# Patient Record
Sex: FEMALE | Race: WHITE | NOT HISPANIC OR LATINO | Employment: OTHER | URBAN - METROPOLITAN AREA
[De-identification: names, ages, dates, MRNs, and addresses within clinical notes are randomized per-mention and may not be internally consistent; named-entity substitution may affect disease eponyms.]

---

## 2020-12-11 ENCOUNTER — HOSPITAL ENCOUNTER (OUTPATIENT)
Dept: MRI IMAGING | Facility: CLINIC | Age: 65
Discharge: HOME | End: 2020-12-11
Admitting: INTERNAL MEDICINE
Payer: COMMERCIAL

## 2020-12-11 DIAGNOSIS — R20.2 PARESTHESIA OF LEFT FOOT: ICD-10-CM

## 2020-12-11 DIAGNOSIS — M79.89 PAIN AND SWELLING OF TOE OF LEFT FOOT: ICD-10-CM

## 2020-12-11 DIAGNOSIS — M79.675 PAIN AND SWELLING OF TOE OF LEFT FOOT: ICD-10-CM

## 2020-12-11 DIAGNOSIS — S90.852D FOREIGN BODY IN LEFT FOOT, SUBSEQUENT ENCOUNTER: ICD-10-CM

## 2020-12-11 PROCEDURE — 73718 MRI LOWER EXTREMITY W/O DYE: CPT | Mod: LT

## 2020-12-11 PROCEDURE — 73718 MRI LOWER EXTREMITY W/O DYE: CPT | Mod: 26 | Performed by: RADIOLOGY

## 2021-01-15 ENCOUNTER — HEALTH MAINTENANCE LETTER (OUTPATIENT)
Age: 66
End: 2021-01-15

## 2021-03-08 NOTE — PROGRESS NOTES
SUBJECTIVE:   CC: Duke Jimenez is an 66 year old woman who presents for preventive health visit.     Patient has been advised of split billing requirements and indicates understanding: Yes  Healthy Habits:     Getting at least 3 servings of Calcium per day:  Yes    Bi-annual eye exam:  Yes    Dental care twice a year:  Yes    Sleep apnea or symptoms of sleep apnea:  None    Diet:  Regular (no restrictions)    Frequency of exercise:  4-5 days/week    Duration of exercise:  45-60 minutes    Taking medications regularly:  Not Applicable    Medication side effects:  Not applicable    PHQ-2 Total Score: 0    Additional concerns today:  Yes      Today's PHQ-2 Score:   PHQ-2 ( 1999 [employer]) 3/9/2021   Q1: Little interest or pleasure in doing things 0   Q2: Feeling down, depressed or hopeless 0   PHQ-2 Score 0   Q1: Little interest or pleasure in doing things Not at all   Q2: Feeling down, depressed or hopeless Not at all   PHQ-2 Score 0       Abuse: Current or Past (Physical, Sexual or Emotional) - No  Do you feel safe in your environment? Yes    Have you ever done Advance Care Planning? (For example, a Health Directive, POLST, or a discussion with a medical provider or your loved ones about your wishes): No, advance care planning information given to patient to review.  Patient declined advance care planning discussion at this time.    Social History     Tobacco Use     Smoking status: Never Smoker     Smokeless tobacco: Never Used   Substance Use Topics     Alcohol use: Never         Alcohol Use 3/9/2021   Prescreen: >3 drinks/day or >7 drinks/week? No       Any new diagnosis of family breast, ovarian, or bowel cancer? No     Reviewed orders with patient.  Reviewed health maintenance and updated orders accordingly - Yes  BP Readings from Last 3 Encounters:   03/10/21 132/70    Wt Readings from Last 3 Encounters:   03/10/21 50.3 kg (111 lb)                  Patient Active Problem List   Diagnosis     Constipation      Post-menopausal     S/P exploratory laparotomy     Advanced care planning/counseling discussion     Past Surgical History:   Procedure Laterality Date     ABDOMEN SURGERY  06/2018    Exploratory Lap - internal hernia reduction, appendectomy, cecopexy.  Dr. June Delatorre     APPENDECTOMY  06/2018     AS ESOPHAGOSCOPY, DIAGNOSTIC  12/14/2012     AS HYSTEROSCOPY, SURGICAL; W/ ENDOMETRIAL ABLATION, ANY METHOD       BREAST BIOPSY, CORE RT/LT  2003    Benign     COLONOSCOPY      12/14/2012. 12/17/2018, 09/17/2020     IR OR ANGIOGRAM  08/06/2018    [facility] Dr. Luevano     LUMPECTOMY BREAST  2004     TONSILLECTOMY       VASCULAR SURGERY  06/2018       Social History     Tobacco Use     Smoking status: Never Smoker     Smokeless tobacco: Never Used   Substance Use Topics     Alcohol use: Never     Family History   Problem Relation Age of Onset     Breast Cancer Sister      Breast Cancer Sister      No Known Problems Mother      Heart Disease Father      Alzheimer Disease Father      Cancer Paternal Grandmother          No current outpatient medications on file.     No Known Allergies    Breast CA Risk Screening:  Breast CA Risk Assessment (FHS-7) 3/9/2021   Do you have a family history of breast, colon, or ovarian cancer? Yes   Did any of your first-degree relatives have breast or ovarian cancer? Yes   Did any of your relatives have bilateral breast cancer? No   Did any man in your family have breast cancer? No   Did any woman in your family have breast and ovarian cancer? Yes   Did any woman in your family have breast cancer before age 50 y? No   Do you have 2 or more relatives with breast and/or ovarian cancer? No   Do you have 2 or more relatives with breast and/or bowel cancer? No         Mammogram Screening: Recommended mammography every 1-2 years with patient discussion and risk factor consideration  Pertinent mammograms are reviewed under the imaging tab.    History of abnormal Pap smear: NO - age 30-65  "PAP every 5 years with negative HPV co-testing recommended     Reviewed and updated as needed this visit by clinical staff  Tobacco  Allergies  Meds   Med Hx  Surg Hx  Fam Hx  Soc Hx        Reviewed and updated as needed this visit by Provider                    Review of Systems   Constitutional: Negative for chills and fever.   HENT: Negative for congestion, ear pain, hearing loss and sore throat.    Eyes: Negative for pain and visual disturbance.   Respiratory: Negative for cough and shortness of breath.    Cardiovascular: Negative for chest pain, palpitations and peripheral edema.   Gastrointestinal: Negative for abdominal pain, constipation, diarrhea, heartburn, hematochezia and nausea.   Breasts:  Negative for tenderness, breast mass and discharge.   Genitourinary: Negative for dysuria, frequency, genital sores, hematuria, pelvic pain, urgency, vaginal bleeding and vaginal discharge.   Musculoskeletal: Negative for arthralgias, joint swelling and myalgias.   Skin: Negative for rash.   Neurological: Negative for dizziness, weakness, headaches and paresthesias.   Psychiatric/Behavioral: Negative for mood changes. The patient is not nervous/anxious.         OBJECTIVE:   /70   Pulse 77   Temp 97.2  F (36.2  C) (Temporal)   Ht 1.577 m (5' 2.09\")   Wt 50.3 kg (111 lb)   SpO2 99%   BMI 20.25 kg/m    Physical Exam  GENERAL: healthy, alert and no distress  EYES: Eyes grossly normal to inspection, PERRL and conjunctivae and sclerae normal  HENT: ear canals and TM's normal, nose and mouth without ulcers or lesions  NECK: no adenopathy, no asymmetry, masses, or scars and thyroid normal to palpation  RESP: lungs clear to auscultation - no rales, rhonchi or wheezes  BREAST: normal without masses, tenderness or nipple discharge and no palpable axillary masses or adenopathy  CV: regular rate and rhythm, normal S1 S2, no S3 or S4, no murmur, click or rub, no peripheral edema and peripheral pulses " "strong  ABDOMEN: soft, nontender, no hepatosplenomegaly, no masses and bowel sounds normal  MS: no gross musculoskeletal defects noted, no edema  SKIN: no suspicious lesions or rashes  NEURO: Normal strength and tone, mentation intact and speech normal  PSYCH: mentation appears normal, affect normal/bright    Diagnostic Test Results:  Labs reviewed in Epic    ASSESSMENT/PLAN:       ICD-10-CM    1. Routine general medical examination at a health care facility  Z00.00 REVIEW OF HEALTH MAINTENANCE PROTOCOL ORDERS   2. Need for prophylactic vaccination with tetanus-diphtheria (Td)  Z23 TD PRESERV FREE, IM (7+ YRS)   3. Advanced care planning/counseling discussion  Z71.89    - reviewed CareEverywhere and updated chart.   - She is up to date on screening labs, recommend repeat in 1 year.   - We discussed COVID vaccination.   - She is up to date on Colon cancer screening, will need to dig further for recommendations on frequency given her history  - Mammogram scheduled for later this month.   - ACD discussed, given form.     Patient has been advised of split billing requirements and indicates understanding: Yes  COUNSELING:  Reviewed preventive health counseling, as reflected in patient instructions       Regular exercise       Healthy diet/nutrition       Immunizations         Colon cancer screening       Advance Care Planning    Estimated body mass index is 20.25 kg/m  as calculated from the following:    Height as of this encounter: 1.577 m (5' 2.09\").    Weight as of this encounter: 50.3 kg (111 lb).        She reports that she has never smoked. She has never used smokeless tobacco.      Counseling Resources:  ATP IV Guidelines  Pooled Cohorts Equation Calculator  Breast Cancer Risk Calculator  BRCA-Related Cancer Risk Assessment: FHS-7 Tool  FRAX Risk Assessment  ICSI Preventive Guidelines  Dietary Guidelines for Americans, 2010  USDA's MyPlate  ASA Prophylaxis  Lung CA Screening    Oscar Garcia PA-C   HEALTH " [facility]

## 2021-03-09 ASSESSMENT — ENCOUNTER SYMPTOMS
SHORTNESS OF BREATH: 0
FREQUENCY: 0
MYALGIAS: 0
HEADACHES: 0
SORE THROAT: 0
HEARTBURN: 0
CHILLS: 0
COUGH: 0
DYSURIA: 0
JOINT SWELLING: 0
ARTHRALGIAS: 0
EYE PAIN: 0
HEMATOCHEZIA: 0
BREAST MASS: 0
HEMATURIA: 0
CONSTIPATION: 0
WEAKNESS: 0
NERVOUS/ANXIOUS: 0
PALPITATIONS: 0
FEVER: 0
ABDOMINAL PAIN: 0
DIZZINESS: 0
PARESTHESIAS: 0
DIARRHEA: 0
NAUSEA: 0

## 2021-03-10 ENCOUNTER — OFFICE VISIT (OUTPATIENT)
Dept: FAMILY MEDICINE | Facility: OTHER | Age: 66
End: 2021-03-10
Payer: COMMERCIAL

## 2021-03-10 VITALS
WEIGHT: 111 LBS | DIASTOLIC BLOOD PRESSURE: 70 MMHG | OXYGEN SATURATION: 99 % | TEMPERATURE: 97.2 F | BODY MASS INDEX: 20.43 KG/M2 | SYSTOLIC BLOOD PRESSURE: 132 MMHG | HEART RATE: 77 BPM | HEIGHT: 62 IN

## 2021-03-10 DIAGNOSIS — Z00.00 ROUTINE GENERAL MEDICAL EXAMINATION AT A HEALTH CARE FACILITY: Primary | ICD-10-CM

## 2021-03-10 DIAGNOSIS — Z23 NEED FOR PROPHYLACTIC VACCINATION WITH TETANUS-DIPHTHERIA (TD): ICD-10-CM

## 2021-03-10 DIAGNOSIS — Z71.89 ADVANCED CARE PLANNING/COUNSELING DISCUSSION: ICD-10-CM

## 2021-03-10 PROBLEM — Z98.890 S/P EXPLORATORY LAPAROTOMY: Status: ACTIVE | Noted: 2018-06-24

## 2021-03-10 PROCEDURE — 99386 PREV VISIT NEW AGE 40-64: CPT | Mod: 25 | Performed by: PHYSICIAN ASSISTANT

## 2021-03-10 PROCEDURE — 90471 IMMUNIZATION ADMIN: CPT | Performed by: PHYSICIAN ASSISTANT

## 2021-03-10 PROCEDURE — 90714 TD VACC NO PRESV 7 YRS+ IM: CPT | Performed by: PHYSICIAN ASSISTANT

## 2021-03-10 ASSESSMENT — ENCOUNTER SYMPTOMS
HEMATURIA: 0
NAUSEA: 0
ABDOMINAL PAIN: 0
COUGH: 0
MYALGIAS: 0
HEMATOCHEZIA: 0
JOINT SWELLING: 0
ARTHRALGIAS: 0
HEARTBURN: 0
SHORTNESS OF BREATH: 0
WEAKNESS: 0
DIZZINESS: 0
DYSURIA: 0
PARESTHESIAS: 0
FEVER: 0
SORE THROAT: 0
EYE PAIN: 0
HEADACHES: 0
DIARRHEA: 0
CHILLS: 0
FREQUENCY: 0
NERVOUS/ANXIOUS: 0
CONSTIPATION: 0
PALPITATIONS: 0
BREAST MASS: 0

## 2021-03-10 ASSESSMENT — MIFFLIN-ST. JEOR: SCORE: 1008.12

## 2021-03-10 NOTE — NURSING NOTE
Prior to immunization administration, verified patients identity using patient s name and date of birth. Please see Immunization Activity for additional information.     Screening Questionnaire for Adult Immunization    Are you sick today?   No   Do you have allergies to medications, food, a vaccine component or latex?   No   Have you ever had a serious reaction after receiving a vaccination?   No   Do you have a long-term health problem with heart, lung, kidney, or metabolic disease (e.g., diabetes), asthma, a blood disorder, no spleen, complement component deficiency, a cochlear implant, or a spinal fluid leak?  Are you on long-term aspirin therapy?   No   Do you have cancer, leukemia, HIV/AIDS, or any other immune system problem?   No   Do you have a parent, brother, or sister with an immune system problem?   No   In the past 3 months, have you taken medications that affect  your immune system, such as prednisone, other steroids, or anticancer drugs; drugs for the treatment of rheumatoid arthritis, Crohn s disease, or psoriasis; or have you had radiation treatments?   No   Have you had a seizure, or a brain or other nervous system problem?   No   During the past year, have you received a transfusion of blood or blood    products, or been given immune (gamma) globulin or antiviral drug?   No   For women: Are you pregnant or is there a chance you could become       pregnant during the next month?   No   Have you received any vaccinations in the past 4 weeks?   No     Immunization questionnaire answers were all negative.        Per orders of Oscar Garcia PA-C, injection of Td given by Jenelle Soto MA. Patient instructed to remain in clinic for 15 minutes afterwards, and to report any adverse reaction to me immediately.       Screening performed by Jenelle Soto MA on 3/10/2021 at 8:40 AM.

## 2021-03-16 ENCOUNTER — OFFICE VISIT (OUTPATIENT)
Dept: PODIATRY | Facility: CLINIC | Age: 66
End: 2021-03-16
Payer: COMMERCIAL

## 2021-03-16 VITALS
HEIGHT: 62 IN | TEMPERATURE: 97.2 F | BODY MASS INDEX: 20.43 KG/M2 | SYSTOLIC BLOOD PRESSURE: 128 MMHG | WEIGHT: 111 LBS | DIASTOLIC BLOOD PRESSURE: 82 MMHG

## 2021-03-16 DIAGNOSIS — I73.00 RAYNAUD'S PHENOMENON WITHOUT GANGRENE: Primary | ICD-10-CM

## 2021-03-16 PROCEDURE — 99203 OFFICE O/P NEW LOW 30 MIN: CPT | Performed by: PODIATRIST

## 2021-03-16 ASSESSMENT — PAIN SCALES - GENERAL: PAINLEVEL: NO PAIN (0)

## 2021-03-16 ASSESSMENT — MIFFLIN-ST. JEOR: SCORE: 1008.17

## 2021-03-16 NOTE — PROGRESS NOTES
HPI:  Duke Jimenez is a 66 year old female who is seen in consultation at the request of self.    Pt presents for eval of:   (Onset, Location, L/R, Character, Treatments, Injury if yes)    MRI Left foot 12/11/2020       Onset May 2019, while [place] and walking in the ocean, came out of water and noticed an open wound across Left great, 2nd and 3rd toes. Did not seek medical care at that time. Still having constant redness, swelling, numbness, intermittent, throbbing at rest only. Denies pain.    Retired.    ROS:  10 point ROS neg other than the symptoms noted above in the HPI.    Patient Active Problem List   Diagnosis     Constipation     Post-menopausal     S/P exploratory laparotomy     Advanced care planning/counseling discussion       PAST MEDICAL HISTORY:   Past Medical History:   Diagnosis Date     AVM (arteriovenous malformation)         PAST SURGICAL HISTORY:   Past Surgical History:   Procedure Laterality Date     ABDOMEN SURGERY  06/2018    Exploratory Lap - internal hernia reduction, appendectomy, cecopexy.  Dr. June Delatorre     APPENDECTOMY  06/2018     AS ESOPHAGOSCOPY, DIAGNOSTIC  12/14/2012     AS HYSTEROSCOPY, SURGICAL; W/ ENDOMETRIAL ABLATION, ANY METHOD       BREAST BIOPSY, CORE RT/LT  2003    Benign     COLONOSCOPY      12/14/2012. 12/17/2018, 09/17/2020     IR OR ANGIOGRAM  08/06/2018    [facility] Dr. Luevano     LUMPECTOMY BREAST  2004     TONSILLECTOMY       VASCULAR SURGERY  06/2018        MEDICATIONS: No current outpatient medications on file.     ALLERGIES:  No Known Allergies     SOCIAL HISTORY:   Social History     Socioeconomic History     Marital status:      Spouse name: Not on file     Number of children: Not on file     Years of education: Not on file     Highest education level: Not on file   Occupational History     Not on file   Social Needs     Financial resource strain: Not on file     Food insecurity     Worry: Not on file     Inability: Not on file      "Transportation needs     Medical: Not on file     Non-medical: Not on file   Tobacco Use     Smoking status: Never Smoker     Smokeless tobacco: Never Used   Substance and Sexual Activity     Alcohol use: Never     Drug use: Never     Sexual activity: Not Currently   Lifestyle     Physical activity     Days per week: Not on file     Minutes per session: Not on file     Stress: Not on file   Relationships     Social connections     Talks on phone: Not on file     Gets together: Not on file     Attends Congregational service: Not on file     Active member of club or organization: Not on file     Attends meetings of clubs or organizations: Not on file     Relationship status: Not on file     Intimate partner violence     Fear of current or ex partner: Not on file     Emotionally abused: Not on file     Physically abused: Not on file     Forced sexual activity: Not on file   Other Topics Concern     Parent/sibling w/ CABG, MI or angioplasty before 65F 55M? No   Social History Narrative     Not on file        FAMILY HISTORY:   Family History   Problem Relation Age of Onset     Breast Cancer Sister      Breast Cancer Sister      No Known Problems Mother      Heart Disease Father      Alzheimer Disease Father      Cancer Paternal Grandmother         EXAM:Vitals: /82 (BP Location: Left arm, Patient Position: Sitting, Cuff Size: Adult Regular)   Temp 97.2  F (36.2  C) (Temporal)   Ht 1.577 m (5' 2.09\")   Wt 50.3 kg (111 lb)   BMI 20.24 kg/m    BMI= Body mass index is 20.24 kg/m .    General appearance: Patient is alert and fully cooperative with history & exam.  No sign of distress is noted during the visit.     Psychiatric: Affect is pleasant & appropriate.  Patient appears motivated to improve health.     Respiratory: Breathing is regular & unlabored while sitting.     HEENT: Hearing is intact to spoken word.  Speech is clear.  No gross evidence of visual impairment that would impact ambulation.     Vascular: DP & PT " pulses are intact & regular bilaterally.  Temperature is warm to cool proximal to distal.  Dependent rubor is noted on all of the digits distally throughout the entire digit on the left foot and less so about the right foot only at the distal aspect of the right foot.  Most of this discoloration and purplish dependent rubor is noted about the 5 digits on the left foot and it is mildly painful on the left second toe.    Neurologic: Lower extremity sensation is intact to light touch.  No evidence of weakness or contracture in the lower extremities.  No evidence of neuropathy.    Dermatologic: Skin is intact to both lower extremities with adequate texture, turgor and tone about the integument.  Mildly diminished hair growth noted about the digits.  No paronychia or evidence of soft tissue infection is noted.     Musculoskeletal: Patient is ambulatory without assistive device or brace.  Negative anterior drawer at the MPJ.  No contracture of the lesser metatarsal phalangeal joints or PIPJ or DIPJ.  Manual muscle strength +5/5 to all 4 quadrants.  Mild hallux valgus noted with fairly longer prominent second metatarsal phalangeal joint however most of her symptoms are associated about the distal digit without hyperkeratosis.    MRI 12/20 demonstrates minimal degenerative changes.  No acute cortical reaction.  Plantar plate intact.  Small reactive spot of edema in the head of the second metatarsal consistent with mild very mild degeneration.       ASSESSMENT:       ICD-10-CM    1. Raynaud's phenomenon without gangrene  I73.00         PLAN:  Reviewed patient's chart in [facility].      3/16/2021   Discussed etiology and treatment options regarding Raynaud's phenomenon.  Recommended avoiding temperature gradients.  Written instructions dispensed.  Discussed etiology and treatment options and typical progression over time.      Also discussed presentation of hallux valgus and overuse overloading of the second MPJ likely  contributing to some of her symptoms and discussed appropriate shoe gear arch support and how and when to address this.  All questions were answered.  Written instructions dispensed.  Follow-up as needed.      Jose Barnes DPM

## 2021-03-16 NOTE — PATIENT INSTRUCTIONS
Raynaud s Phenomenon  Raynaud s phenomenon is a condition which results in a bluish-white discoloration of fingers and toes, often as a result of exposure to cold. Stress, smoking, and certain medications may trigger or worsen symptoms. The color change, which occurs from spasms in small blood vessels, becomes red and then returns to normal when blood flow resumes.    The condition most often affects women, with symptoms varying depending on the severity of the condition. Because there are no specific blood tests to diagnose this condition, the diagnosis is based on symptoms.    However, your doctor may order blood tests to determine whether the Raynaud s phenomenon is associated with certain autoimmune diseases or other medical conditions.  Treatment for Raynaud s phenomenon is aimed at prevention and protection of the digits.  Self-care and prevention steps, such as dressing in layers or wearing gloves or heavy socks, usually are effective in dealing with mild symptoms of Raynaud's. If these aren't adequate, however, medications are available to treat more-severe forms of the condition. The goals of treatment are to:   Reduce the number and severity of attacks   Prevent tissue damage   Treat any underlying disease or condition     Don't smoke. Smoking causes skin temperature to drop by constricting blood vessels, which may lead to an attack. Inhaling secondhand smoke also may aggravate Raynaud's.   Exercise. Your doctor may encourage you to exercise regularly, particularly if you have primary Raynaud's. Exercise can increase circulation, among other health benefits.   Control stress. Because stress may trigger an attack, learning to recognize and avoid stressful situations may help control the number of attacks.   Avoid caffeine. Caffeine causes your blood vessels to narrow and may increase the signs and symptoms of Raynaud's.   Take care of your hands and feet. If you have Raynaud's, guard your hands and feet  from injury. Don't walk barefoot. Take care of your nails to avoid injuring sensitive toes and fingertips. In addition, avoid wearing anything that compresses blood vessels in your hands or feet, such as tight wristbands, rings or footwear.   Avoid workplace triggers. Avoiding tools that vibrate the hand may reduce the frequency of attacks.   During an attack: What should you do?  What should you do if you're experiencing an attack of Raynaud's? The first and most important action is to warm your hands or feet or any other affected areas of skin. The following steps can help you gently warm your fingers and toes:   Move to a warmer area.   Place your hands under your armpits.   Wiggle your fingers and toes.   Make wide circles (windmills) with your arms.   Run warm -- but not hot -- water over your fingers and toes.   Massage your hands and feet.   If a stressful situation triggers an attack, you can help stop the attack by getting out of the stressful situation and relaxing. If you're trained in biofeedback, you can use this technique along with warming your hands or feet in water to help lessen the attack.     Lifestyle changes and supplements that encourage better circulation may be effective alternatives for managing Raynaud's. If you're interested, talk to your doctor about:   Fish oil. Taking fish oil supplements could help improve your tolerance to cold and delay the narrowing of your blood vessels that triggers Raynaud's syndrome attacks.   Ginkgo. Ginkgo supplements could help decrease the number of Raynaud's attacks you have.   Biofeedback. Using your mind to control body temperature (biofeedback) may help decrease the severity and frequency of attacks. Biofeedback includes guided imagery to increase the temperature of hands and feet, deep breathing, and other relaxation exercises. Your doctor may be able to suggest a therapist who can help you learn biofeedback techniques. Books and tapes also are  available on the subject.   As with any supplement, be sure to talk to your doctor before adding it to your treatment regimen. Your doctor can warn you if there are any potential drug interactions or side effects of alternative treatments.   Coping with the stress and nuisance of Raynaud's takes patience and effort. Work with your doctor to manage your condition and maintain a positive attitude. The majority of people with Raynaud's respond to treatment.   Medications  Depending on the cause of your symptoms, medications may help treat Raynaud's. To widen (dilate) blood vessels and promote circulation, your doctor may prescribe:   Calcium channel blockers. These drugs relax and open up small blood vessels in your hands and feet. They decrease the frequency and severity of attacks in most people with Raynaud's. These drugs can also help heal skin ulcers on your fingers or toes. Examples include nifedipine (Adalat CC, Afeditab CR, Procardia), amlodipine (Norvasc) and felodipine (Plendil).   Alpha blockers. Some people find relief with drugs called alpha blockers, which counteract the actions of norepinephrine, a hormone that constricts blood vessels. Examples include prazosin (Minipress) and doxazosin (Cardura).   Vasodilators. Some doctors prescribe a vasodilator -- a drug that relaxes blood vessels -- such as nitroglycerin cream to your fingers to help heal skin ulcers. Your doctor may also prescribe vasodilator drugs that are commonly used to treat other conditions, but may effectively relieve the symptoms of Raynaud's. These drugs include the high blood pressure drug losartan (Cozaar), the erectile dysfunction medication sildenafil (Viagra, Revatio), the antidepressant medication fluoxetine (Prozac, Sarafem), and a class of medication called prostaglandins.   You and your doctor may find that one drug works better for you than another. Some drugs used to treat Raynaud's have side effects that may require you to  stop taking the medication. A drug may also lose effectiveness over time. Work with your doctor to find what works best for you.   Some medications actually can aggravate Raynaud's by leading to increased blood vessel spasm. Your doctor may recommend that you avoid taking:   Certain over-the-counter (OTC) cold drugs. Examples include drugs that contain pseudoephedrine (Chlor-Trimeton, Sudafed).   Beta blockers. This class of drugs, used to treat high blood pressure and heart disease, includes metoprolol (Lopressor, Toprol XL), nadolol (Corgard) and propranolol (Inderal, Innopran XL).   Birth control pills. If you use birth control pills, you may wish to switch to another method of contraception because these drugs affect your circulation and may make you more prone to attacks. Talk to your doctor before stopping the pill.   If you have questions about how best to manage Raynaud's, contact your doctor. Your primary care doctor may refer you to a physician who specializes in treating Raynaud's.   Surgeries and medical procedures  Sometimes in cases of severe Raynaud's, approaches other than medications may be a treatment option:   Nerve surgery. Nerves called sympathetic nerves in your hands and feet control the opening and narrowing of blood vessels in your skin. Sometimes it's necessary in cases of severe Raynaud's to cut these nerves to interrupt their exaggerated response. Through small incisions in the affected hands or feet, a doctor strips away these tiny nerves around the blood vessels. The surgery, called sympathectomy, may reduce the frequency and duration of attacks, but it's not always successful.   Chemical injection. Doctors can inject chemicals to block sympathetic nerves in affected hands or feet. You may need to have the procedure repeated if symptoms return or persist.   Amputation. Sometimes, doctors need to remove tissue damaged from a lack of blood supply. This may include amputating a finger or  toe affected by Raynaud's in which the blood supply has been completely blocked and the tissue has developed gangrene. But this is rare.

## 2021-03-16 NOTE — LETTER
3/16/2021         RE: Duke Jimenez  [address]  [facility] [address]        Dear Colleague,    Thank you for referring your patient, Duke Jimenez, to the [facility]. Please see a copy of my visit note below.    HPI:  Duke Jimenez is a 66 year old female who is seen in consultation at the request of self.    Pt presents for eval of:   (Onset, Location, L/R, Character, Treatments, Injury if yes)    MRI Left foot 12/11/2020       Onset May 2019, while [place] and walking in the ocean, came out of water and noticed an open wound across Left great, 2nd and 3rd toes. Did not seek medical care at that time. Still having constant redness, swelling, numbness, intermittent, throbbing at rest only. Denies pain.    Retired.    ROS:  10 point ROS neg other than the symptoms noted above in the HPI.    Patient Active Problem List   Diagnosis     Constipation     Post-menopausal     S/P exploratory laparotomy     Advanced care planning/counseling discussion       PAST MEDICAL HISTORY:   Past Medical History:   Diagnosis Date     AVM (arteriovenous malformation)         PAST SURGICAL HISTORY:   Past Surgical History:   Procedure Laterality Date     ABDOMEN SURGERY  06/2018    Exploratory Lap - internal hernia reduction, appendectomy, cecopexy.  Dr. June Delatorre     APPENDECTOMY  06/2018     AS ESOPHAGOSCOPY, DIAGNOSTIC  12/14/2012     AS HYSTEROSCOPY, SURGICAL; W/ ENDOMETRIAL ABLATION, ANY METHOD       BREAST BIOPSY, CORE RT/LT  2003    Benign     COLONOSCOPY      12/14/2012. 12/17/2018, 09/17/2020     IR OR ANGIOGRAM  08/06/2018    [facility] Dr. Luevano     LUMPECTOMY BREAST  2004     TONSILLECTOMY       VASCULAR SURGERY  06/2018        MEDICATIONS: No current outpatient medications on file.     ALLERGIES:  No Known Allergies     SOCIAL HISTORY:   Social History     Socioeconomic History     Marital status:      Spouse name: Not on file     Number of children: Not on file      "Years of education: Not on file     Highest education level: Not on file   Occupational History     Not on file   Social Needs     Financial resource strain: Not on file     Food insecurity     Worry: Not on file     Inability: Not on file     Transportation needs     Medical: Not on file     Non-medical: Not on file   Tobacco Use     Smoking status: Never Smoker     Smokeless tobacco: Never Used   Substance and Sexual Activity     Alcohol use: Never     Drug use: Never     Sexual activity: Not Currently   Lifestyle     Physical activity     Days per week: Not on file     Minutes per session: Not on file     Stress: Not on file   Relationships     Social connections     Talks on phone: Not on file     Gets together: Not on file     Attends Congregation service: Not on file     Active member of club or organization: Not on file     Attends meetings of clubs or organizations: Not on file     Relationship status: Not on file     Intimate partner violence     Fear of current or ex partner: Not on file     Emotionally abused: Not on file     Physically abused: Not on file     Forced sexual activity: Not on file   Other Topics Concern     Parent/sibling w/ CABG, MI or angioplasty before 65F 55M? No   Social History Narrative     Not on file        FAMILY HISTORY:   Family History   Problem Relation Age of Onset     Breast Cancer Sister      Breast Cancer Sister      No Known Problems Mother      Heart Disease Father      Alzheimer Disease Father      Cancer Paternal Grandmother         EXAM:Vitals: /82 (BP Location: Left arm, Patient Position: Sitting, Cuff Size: Adult Regular)   Temp 97.2  F (36.2  C) (Temporal)   Ht 1.577 m (5' 2.09\")   Wt 50.3 kg (111 lb)   BMI 20.24 kg/m    BMI= Body mass index is 20.24 kg/m .    General appearance: Patient is alert and fully cooperative with history & exam.  No sign of distress is noted during the visit.     Psychiatric: Affect is pleasant & appropriate.  Patient appears " motivated to improve health.     Respiratory: Breathing is regular & unlabored while sitting.     HEENT: Hearing is intact to spoken word.  Speech is clear.  No gross evidence of visual impairment that would impact ambulation.     Vascular: DP & PT pulses are intact & regular bilaterally.  Temperature is warm to cool proximal to distal.  Dependent rubor is noted on all of the digits distally throughout the entire digit on the left foot and less so about the right foot only at the distal aspect of the right foot.  Most of this discoloration and purplish dependent rubor is noted about the 5 digits on the left foot and it is mildly painful on the left second toe.    Neurologic: Lower extremity sensation is intact to light touch.  No evidence of weakness or contracture in the lower extremities.  No evidence of neuropathy.    Dermatologic: Skin is intact to both lower extremities with adequate texture, turgor and tone about the integument.  Mildly diminished hair growth noted about the digits.  No paronychia or evidence of soft tissue infection is noted.     Musculoskeletal: Patient is ambulatory without assistive device or brace.  Negative anterior drawer at the MPJ.  No contracture of the lesser metatarsal phalangeal joints or PIPJ or DIPJ.  Manual muscle strength +5/5 to all 4 quadrants.  Mild hallux valgus noted with fairly longer prominent second metatarsal phalangeal joint however most of her symptoms are associated about the distal digit without hyperkeratosis.    MRI 12/20 demonstrates minimal degenerative changes.  No acute cortical reaction.  Plantar plate intact.  Small reactive spot of edema in the head of the second metatarsal consistent with mild very mild degeneration.       ASSESSMENT:       ICD-10-CM    1. Raynaud's phenomenon without gangrene  I73.00         PLAN:  Reviewed patient's chart in [facility].      3/16/2021   Discussed etiology and treatment options regarding Raynaud's phenomenon.  Recommended  avoiding temperature gradients.  Written instructions dispensed.  Discussed etiology and treatment options and typical progression over time.      Also discussed presentation of hallux valgus and overuse overloading of the second MPJ likely contributing to some of her symptoms and discussed appropriate shoe gear arch support and how and when to address this.  All questions were answered.  Written instructions dispensed.  Follow-up as needed.      Jose Barnes DPM            Again, thank you for allowing me to participate in the care of your patient.        Sincerely,        Jose Barnes DPM

## 2021-03-23 ENCOUNTER — ANCILLARY PROCEDURE (OUTPATIENT)
Dept: MAMMOGRAPHY | Facility: OTHER | Age: 66
End: 2021-03-23
Payer: COMMERCIAL

## 2021-03-23 DIAGNOSIS — Z12.31 VISIT FOR SCREENING MAMMOGRAM: ICD-10-CM

## 2021-03-23 PROCEDURE — 77067 SCR MAMMO BI INCL CAD: CPT | Mod: TC | Performed by: RADIOLOGY

## 2021-03-23 PROCEDURE — 77063 BREAST TOMOSYNTHESIS BI: CPT | Mod: TC | Performed by: RADIOLOGY

## 2021-05-11 ENCOUNTER — IMMUNIZATION (OUTPATIENT)
Dept: PEDIATRICS | Facility: CLINIC | Age: 66
End: 2021-05-11
Payer: COMMERCIAL

## 2021-05-11 PROCEDURE — 91300 PR COVID VAC PFIZER DIL RECON 30 MCG/0.3 ML IM: CPT

## 2021-05-11 PROCEDURE — 0001A PR COVID VAC PFIZER DIL RECON 30 MCG/0.3 ML IM: CPT

## 2021-05-12 ENCOUNTER — HOSPITAL ENCOUNTER (EMERGENCY)
Facility: CLINIC | Age: 66
Discharge: HOME | End: 2021-05-13
Attending: FAMILY MEDICINE | Admitting: FAMILY MEDICINE
Payer: COMMERCIAL

## 2021-05-12 DIAGNOSIS — Z20.822 SUSPECTED COVID-19 VIRUS INFECTION: ICD-10-CM

## 2021-05-12 DIAGNOSIS — R11.2 NON-INTRACTABLE VOMITING WITH NAUSEA, UNSPECIFIED VOMITING TYPE: ICD-10-CM

## 2021-05-12 DIAGNOSIS — R51.9 ACUTE NONINTRACTABLE HEADACHE, UNSPECIFIED HEADACHE TYPE: ICD-10-CM

## 2021-05-12 DIAGNOSIS — T88.1XXA POST-VACCINATION REACTION, INITIAL ENCOUNTER: ICD-10-CM

## 2021-05-12 LAB
ANION GAP SERPL CALCULATED.3IONS-SCNC: 6 MMOL/L (ref 3–14)
BASOPHILS # BLD AUTO: 0 10E9/L (ref 0–0.2)
BASOPHILS NFR BLD AUTO: 0.7 %
BUN SERPL-MCNC: 6 MG/DL (ref 7–30)
CALCIUM SERPL-MCNC: 10 MG/DL (ref 8.5–10.1)
CHLORIDE SERPL-SCNC: 102 MMOL/L (ref 94–109)
CO2 SERPL-SCNC: 26 MMOL/L (ref 20–32)
CREAT SERPL-MCNC: 0.54 MG/DL (ref 0.52–1.04)
DIFFERENTIAL METHOD BLD: ABNORMAL
EOSINOPHIL # BLD AUTO: 0 10E9/L (ref 0–0.7)
EOSINOPHIL NFR BLD AUTO: 0.2 %
ERYTHROCYTE [DISTWIDTH] IN BLOOD BY AUTOMATED COUNT: 12.2 % (ref 10–15)
GFR SERPL CREATININE-BSD FRML MDRD: >90 ML/MIN/{1.73_M2}
GLUCOSE SERPL-MCNC: 108 MG/DL (ref 70–99)
HCT VFR BLD AUTO: 39.7 % (ref 35–47)
HGB BLD-MCNC: 13.7 G/DL (ref 11.7–15.7)
IMM GRANULOCYTES # BLD: 0 10E9/L (ref 0–0.4)
IMM GRANULOCYTES NFR BLD: 0.7 %
LYMPHOCYTES # BLD AUTO: 0.7 10E9/L (ref 0.8–5.3)
LYMPHOCYTES NFR BLD AUTO: 14.6 %
MCH RBC QN AUTO: 29.5 PG (ref 26.5–33)
MCHC RBC AUTO-ENTMCNC: 34.5 G/DL (ref 31.5–36.5)
MCV RBC AUTO: 85 FL (ref 78–100)
MONOCYTES # BLD AUTO: 0.5 10E9/L (ref 0–1.3)
MONOCYTES NFR BLD AUTO: 11.8 %
NEUTROPHILS # BLD AUTO: 3.3 10E9/L (ref 1.6–8.3)
NEUTROPHILS NFR BLD AUTO: 72 %
NRBC # BLD AUTO: 0 10*3/UL
NRBC BLD AUTO-RTO: 0 /100
PLATELET # BLD AUTO: 193 10E9/L (ref 150–450)
POTASSIUM SERPL-SCNC: 3.5 MMOL/L (ref 3.4–5.3)
RBC # BLD AUTO: 4.65 10E12/L (ref 3.8–5.2)
SODIUM SERPL-SCNC: 134 MMOL/L (ref 133–144)
WBC # BLD AUTO: 4.6 10E9/L (ref 4–11)

## 2021-05-12 PROCEDURE — 80048 BASIC METABOLIC PNL TOTAL CA: CPT | Performed by: FAMILY MEDICINE

## 2021-05-12 PROCEDURE — U0005 INFEC AGEN DETEC AMPLI PROBE: HCPCS | Performed by: FAMILY MEDICINE

## 2021-05-12 PROCEDURE — 96374 THER/PROPH/DIAG INJ IV PUSH: CPT | Performed by: FAMILY MEDICINE

## 2021-05-12 PROCEDURE — 99285 EMERGENCY DEPT VISIT HI MDM: CPT | Mod: 25 | Performed by: FAMILY MEDICINE

## 2021-05-12 PROCEDURE — 258N000003 HC RX IP 258 OP 636: Performed by: FAMILY MEDICINE

## 2021-05-12 PROCEDURE — 93010 ELECTROCARDIOGRAM REPORT: CPT | Performed by: FAMILY MEDICINE

## 2021-05-12 PROCEDURE — 93005 ELECTROCARDIOGRAM TRACING: CPT | Performed by: FAMILY MEDICINE

## 2021-05-12 PROCEDURE — 85025 COMPLETE CBC W/AUTO DIFF WBC: CPT | Performed by: FAMILY MEDICINE

## 2021-05-12 PROCEDURE — 96361 HYDRATE IV INFUSION ADD-ON: CPT | Performed by: FAMILY MEDICINE

## 2021-05-12 PROCEDURE — 99284 EMERGENCY DEPT VISIT MOD MDM: CPT | Mod: 25 | Performed by: FAMILY MEDICINE

## 2021-05-12 PROCEDURE — 250N000013 HC RX MED GY IP 250 OP 250 PS 637: Performed by: FAMILY MEDICINE

## 2021-05-12 PROCEDURE — 96375 TX/PRO/DX INJ NEW DRUG ADDON: CPT | Performed by: FAMILY MEDICINE

## 2021-05-12 PROCEDURE — 250N000011 HC RX IP 250 OP 636: Performed by: FAMILY MEDICINE

## 2021-05-12 PROCEDURE — U0003 INFECTIOUS AGENT DETECTION BY NUCLEIC ACID (DNA OR RNA); SEVERE ACUTE RESPIRATORY SYNDROME CORONAVIRUS 2 (SARS-COV-2) (CORONAVIRUS DISEASE [COVID-19]), AMPLIFIED PROBE TECHNIQUE, MAKING USE OF HIGH THROUGHPUT TECHNOLOGIES AS DESCRIBED BY CMS-2020-01-R: HCPCS | Performed by: FAMILY MEDICINE

## 2021-05-12 RX ORDER — KETOROLAC TROMETHAMINE 15 MG/ML
15 INJECTION, SOLUTION INTRAMUSCULAR; INTRAVENOUS ONCE
Status: COMPLETED | OUTPATIENT
Start: 2021-05-12 | End: 2021-05-12

## 2021-05-12 RX ORDER — HYDROCODONE BITARTRATE AND ACETAMINOPHEN 5; 325 MG/1; MG/1
1 TABLET ORAL ONCE
Status: COMPLETED | OUTPATIENT
Start: 2021-05-12 | End: 2021-05-12

## 2021-05-12 RX ORDER — SODIUM CHLORIDE 9 MG/ML
INJECTION, SOLUTION INTRAVENOUS CONTINUOUS
Status: DISCONTINUED | OUTPATIENT
Start: 2021-05-12 | End: 2021-05-13 | Stop reason: HOSPADM

## 2021-05-12 RX ORDER — ONDANSETRON 2 MG/ML
4 INJECTION INTRAMUSCULAR; INTRAVENOUS EVERY 30 MIN PRN
Status: DISCONTINUED | OUTPATIENT
Start: 2021-05-12 | End: 2021-05-13 | Stop reason: HOSPADM

## 2021-05-12 RX ADMIN — SODIUM CHLORIDE 1000 ML: 9 INJECTION, SOLUTION INTRAVENOUS at 22:59

## 2021-05-12 RX ADMIN — SODIUM CHLORIDE 1000 ML: 9 INJECTION, SOLUTION INTRAVENOUS at 21:54

## 2021-05-12 RX ADMIN — ONDANSETRON 4 MG: 2 INJECTION INTRAMUSCULAR; INTRAVENOUS at 21:56

## 2021-05-12 RX ADMIN — HYDROCODONE BITARTRATE AND ACETAMINOPHEN 1 TABLET: 5; 325 TABLET ORAL at 23:36

## 2021-05-12 RX ADMIN — KETOROLAC TROMETHAMINE 15 MG: 15 INJECTION, SOLUTION INTRAMUSCULAR; INTRAVENOUS at 21:58

## 2021-05-13 VITALS
OXYGEN SATURATION: 96 % | TEMPERATURE: 99.5 F | RESPIRATION RATE: 18 BRPM | HEART RATE: 74 BPM | DIASTOLIC BLOOD PRESSURE: 83 MMHG | SYSTOLIC BLOOD PRESSURE: 136 MMHG

## 2021-05-13 LAB
LABORATORY COMMENT REPORT: ABNORMAL
SARS-COV-2 RNA RESP QL NAA+PROBE: POSITIVE
SPECIMEN SOURCE: ABNORMAL

## 2021-05-13 RX ORDER — HYDROCODONE BITARTRATE AND ACETAMINOPHEN 5; 325 MG/1; MG/1
1 TABLET ORAL EVERY 6 HOURS PRN
Qty: 2 TABLET | Refills: 0 | Status: SHIPPED | OUTPATIENT
Start: 2021-05-13 | End: 2021-05-14

## 2021-05-13 RX ORDER — ONDANSETRON 4 MG/1
4 TABLET, ORALLY DISINTEGRATING ORAL EVERY 6 HOURS PRN
Qty: 2 TABLET | Refills: 0 | Status: SHIPPED | OUTPATIENT
Start: 2021-05-13 | End: 2021-05-14

## 2021-05-13 RX ORDER — ONDANSETRON 4 MG/1
4 TABLET, ORALLY DISINTEGRATING ORAL EVERY 6 HOURS PRN
Status: DISCONTINUED | OUTPATIENT
Start: 2021-05-13 | End: 2021-05-13 | Stop reason: HOSPADM

## 2021-05-13 RX ORDER — HYDROCODONE BITARTRATE AND ACETAMINOPHEN 5; 325 MG/1; MG/1
1 TABLET ORAL EVERY 6 HOURS PRN
Status: DISCONTINUED | OUTPATIENT
Start: 2021-05-13 | End: 2021-05-13 | Stop reason: HOSPADM

## 2021-05-13 ASSESSMENT — ENCOUNTER SYMPTOMS
SHORTNESS OF BREATH: 0
PSYCHIATRIC NEGATIVE: 1
ACTIVITY CHANGE: 1
COUGH: 0
EYES NEGATIVE: 1
HEADACHES: 1
APPETITE CHANGE: 1
RESPIRATORY NEGATIVE: 1
MYALGIAS: 1
PALPITATIONS: 0
FEVER: 0
NAUSEA: 1
FATIGUE: 1

## 2021-05-13 NOTE — ED NOTES
Pt up to BR with SBA. Tolerated well. Pt states she feels better. Pt states her HA is better, nausea comes and goes. HOB lowered and lights dimmed. No other requests. Primary RN notified.

## 2021-05-13 NOTE — ED PROVIDER NOTES
History     Chief Complaint   Patient presents with     Headache     HPI  Duke Jimenez is a 66 year old female who presented to the emergency room today secondary to concerns of body aches, headache, and nausea symptoms.  She states her symptoms developed several hours after receiving her first COVID-19 immunization earlier today.  She states that her  has been hospitalized with COVID-19 symptoms since the middle of April and is finally starting to improve and they are hoping that he will return home soon.  She states that she is generally healthy and is on no current chronic medications.    Allergies:  No Known Allergies    Problem List:    Patient Active Problem List    Diagnosis Date Noted     Advanced care planning/counseling discussion 03/10/2021     Priority: Medium     S/P exploratory laparotomy 06/24/2018     Priority: Medium     Constipation 06/12/2013     Priority: Medium     Post-menopausal 06/12/2013     Priority: Medium        Past Medical History:    Past Medical History:   Diagnosis Date     AVM (arteriovenous malformation)        Past Surgical History:    Past Surgical History:   Procedure Laterality Date     ABDOMEN SURGERY  06/2018    Exploratory Lap - internal hernia reduction, appendectomy, cecopexy.  Dr. June Delatorre     APPENDECTOMY  06/2018     AS ESOPHAGOSCOPY, DIAGNOSTIC  12/14/2012     AS HYSTEROSCOPY, SURGICAL; W/ ENDOMETRIAL ABLATION, ANY METHOD       BREAST BIOPSY, CORE RT/LT  2003    Benign     COLONOSCOPY      12/14/2012. 12/17/2018, 09/17/2020     IR OR ANGIOGRAM  08/06/2018    [facility] Dr. Luevano     LUMPECTOMY BREAST  2004     TONSILLECTOMY       VASCULAR SURGERY  06/2018       Family History:    Family History   Problem Relation Age of Onset     Breast Cancer Sister      Breast Cancer Sister      No Known Problems Mother      Heart Disease Father      Alzheimer Disease Father      Cancer Paternal Grandmother        Social History:  Marital Status:    [2]  Social History     Tobacco Use     Smoking status: Never Smoker     Smokeless tobacco: Never Used   Substance Use Topics     Alcohol use: Never     Drug use: Never        Medications:    HYDROcodone-acetaminophen (NORCO) 5-325 MG tablet  ondansetron (ZOFRAN ODT) 4 MG ODT tab          Review of Systems   Constitutional: Positive for activity change, appetite change and fatigue. Negative for fever.   HENT: Negative.    Eyes: Negative.    Respiratory: Negative.  Negative for cough and shortness of breath.    Cardiovascular: Positive for chest pain (Patient notes occasional chest discomfort that comes and goes but is not constant to the anterior chest.). Negative for palpitations and leg swelling.   Gastrointestinal: Positive for nausea.   Genitourinary: Negative.    Musculoskeletal: Positive for myalgias.   Skin: Negative.  Negative for rash.        She has not noticed any skin rash to the area immunization to left arm.   Neurological: Positive for headaches.   Psychiatric/Behavioral: Negative.    All other systems reviewed and are negative.      Physical Exam   BP: (!) 163/104  Pulse: 79  Temp: 99.5  F (37.5  C)  Resp: 18  SpO2: 99 %    Vitals:    05/12/21 2230 05/12/21 2300 05/12/21 2330 05/13/21 0000   BP: (!) 150/98 (!) 148/91 123/80 136/83   Pulse: 77 81 81 74   Resp:       Temp:       TempSrc:       SpO2: 98% 99% 98% 96%       Physical Exam  Vitals signs and nursing note reviewed.   Constitutional:       General: She is in acute distress (Secondary to nausea.).      Appearance: She is normal weight. She is ill-appearing. She is not toxic-appearing or diaphoretic.      Interventions: Face mask in place.   HENT:      Head: Normocephalic and atraumatic.   Eyes:      Extraocular Movements: Extraocular movements intact.      Conjunctiva/sclera: Conjunctivae normal.      Pupils: Pupils are equal, round, and reactive to light.   Neck:      Musculoskeletal: Normal range of motion and neck supple.   Cardiovascular:       Rate and Rhythm: Normal rate.      Pulses: Normal pulses.      Heart sounds: No murmur.   Pulmonary:      Effort: Pulmonary effort is normal. No respiratory distress.      Breath sounds: Normal breath sounds.   Abdominal:      Tenderness: There is no abdominal tenderness.   Musculoskeletal: Normal range of motion.   Skin:     General: Skin is warm.      Capillary Refill: Capillary refill takes less than 2 seconds.      Findings: No rash.   Neurological:      General: No focal deficit present.      Mental Status: She is alert and oriented to person, place, and time.   Psychiatric:         Mood and Affect: Mood normal.         Behavior: Behavior normal.         Thought Content: Thought content normal.         Judgment: Judgment normal.         ED Course        Procedures               EKG Interpretation:      Interpreted by Jason Aleman DO  Time reviewed: 00:10  Symptoms at time of EKG: nausea, myalgia, headache   Rhythm: normal sinus   Rate: normal  Axis: normal  Ectopy: none  Conduction: normal  ST Segments/ T Waves: No ST-T wave changes  Q Waves: none  Comparison to prior: No old EKG available    Clinical Impression: normal EKG          Critical Care time:  none               Results for orders placed or performed during the hospital encounter of 05/12/21 (from the past 24 hour(s))   CBC with platelets differential   Result Value Ref Range    WBC 4.6 4.0 - 11.0 10e9/L    RBC Count 4.65 3.8 - 5.2 10e12/L    Hemoglobin 13.7 11.7 - 15.7 g/dL    Hematocrit 39.7 35.0 - 47.0 %    MCV 85 78 - 100 fl    MCH 29.5 26.5 - 33.0 pg    MCHC 34.5 31.5 - 36.5 g/dL    RDW 12.2 10.0 - 15.0 %    Platelet Count 193 150 - 450 10e9/L    Diff Method Automated Method     % Neutrophils 72.0 %    % Lymphocytes 14.6 %    % Monocytes 11.8 %    % Eosinophils 0.2 %    % Basophils 0.7 %    % Immature Granulocytes 0.7 %    Nucleated RBCs 0 0 /100    Absolute Neutrophil 3.3 1.6 - 8.3 10e9/L    Absolute Lymphocytes 0.7 (L) 0.8 - 5.3  10e9/L    Absolute Monocytes 0.5 0.0 - 1.3 10e9/L    Absolute Eosinophils 0.0 0.0 - 0.7 10e9/L    Absolute Basophils 0.0 0.0 - 0.2 10e9/L    Abs Immature Granulocytes 0.0 0 - 0.4 10e9/L    Absolute Nucleated RBC 0.0    Basic metabolic panel   Result Value Ref Range    Sodium 134 133 - 144 mmol/L    Potassium 3.5 3.4 - 5.3 mmol/L    Chloride 102 94 - 109 mmol/L    Carbon Dioxide 26 20 - 32 mmol/L    Anion Gap 6 3 - 14 mmol/L    Glucose 108 (H) 70 - 99 mg/dL    Urea Nitrogen 6 (L) 7 - 30 mg/dL    Creatinine 0.54 0.52 - 1.04 mg/dL    GFR Estimate >90 >60 mL/min/[1.73_m2]    GFR Estimate If Black >90 >60 mL/min/[1.73_m2]    Calcium 10.0 8.5 - 10.1 mg/dL   Symptomatic SARS-CoV-2 COVID-19 Virus (Coronavirus) by PCR    Specimen: Nasopharyngeal     Medications   0.9% sodium chloride BOLUS (0 mLs Intravenous Stopped 5/12/21 2257)     Followed by   0.9% sodium chloride BOLUS (0 mLs Intravenous Stopped 5/12/21 2359)   ketorolac (TORADOL) injection 15 mg (15 mg Intravenous Given 5/12/21 2158)   HYDROcodone-acetaminophen (NORCO) 5-325 MG per tablet 1 tablet (1 tablet Oral Given 5/12/21 2336)       Assessments & Plan (with Medical Decision Making)  66-year-old female to the ER secondary to concerns of possible adverse reaction to her Covid immunization received earlier yesterday.  Patient's  is currently hospitalized and has been for the last several weeks because of his own Covid infection.  Screening blood tests nonspecific as a reason for her symptoms.  We discussed the possible adverse reaction to the vaccine versus true COVID-19 infection as reason for symptoms.  A nasopharyngeal swab for COVID-19 was performed with results pending.  Patient felt some improved after the IV fluids and medications given.  We elected to discharge home.  We discussed the potential risk if this is true Covid infection of worsening condition specifically of shortness of breath issues and to return to the ER if she develops that.  She is  also instructed to look for her COVID-19 results through [facility] and was notified that she would be getting a call as well if her test prove positive.  She was given discharge instructions regarding quarantine as well as instructions on the signs and symptoms of adverse drug reactions as well as signs and symptoms of COVID-19 infection.  Treatment follows instructions return for increase symptoms as directed on the handout if needed.     I have reviewed the nursing notes.    I have reviewed the findings, diagnosis, plan and need for follow up with the patient.       Discharge Medication List as of 5/13/2021 12:39 AM      START taking these medications    Details   HYDROcodone-acetaminophen (NORCO) 5-325 MG tablet Take 1 tablet by mouth every 6 hours as needed for severe pain, Disp-2 tablet, R-0, Local Print      ondansetron (ZOFRAN ODT) 4 MG ODT tab Take 1 tablet (4 mg) by mouth every 6 hours as needed for nausea, Disp-2 tablet, R-0, Local Print                  I verbally discussed the findings of the evaluation today in the ER. I have verbally discussed with Duke the suggested treatment(s) as described in the discharge instructions and handouts. I have prescribed the above listed medications and instructed her on appropriate use of these medications.      I have verbally suggested she follow-up in her clinic or return to the ER for increased symptoms. See the follow-up recommendations documented  in the after visit summary in this visit's EPIC chart.    Final diagnoses:   Post-vaccination reaction, initial encounter   Non-intractable vomiting with nausea, unspecified vomiting type   Acute nonintractable headache, unspecified headache type   Suspected COVID-19 virus infection       5/12/2021   [facility] EMERGENCY DEPT     Jason Aleman,   05/13/21 2385

## 2021-05-13 NOTE — DISCHARGE INSTRUCTIONS
Please read and follow the handout(s) instructions.  These will be important should your COVID-19 test come back positive.  You can use [facility] to find the results of the COVID-19 test as it should be available tomorrow.  I suspect that your symptoms are likely caused by immune response to the Covid vaccine and hopefully you should start feeling much improved in the next day or 2.  Increase your fluid intake. Drinking small amounts often is the best way to take in more fluids when you are feeling nauseated.  Return, if needed, for increased or worsening symptoms and as directed by the handout(s).

## 2021-06-01 ENCOUNTER — IMMUNIZATION (OUTPATIENT)
Dept: PEDIATRICS | Facility: CLINIC | Age: 66
End: 2021-06-01
Attending: INTERNAL MEDICINE
Payer: COMMERCIAL

## 2021-06-01 PROCEDURE — 0002A PR COVID VAC PFIZER DIL RECON 30 MCG/0.3 ML IM: CPT

## 2021-06-01 PROCEDURE — 91300 PR COVID VAC PFIZER DIL RECON 30 MCG/0.3 ML IM: CPT

## 2021-10-11 ENCOUNTER — HEALTH MAINTENANCE LETTER (OUTPATIENT)
Age: 66
End: 2021-10-11

## 2022-01-26 ENCOUNTER — OFFICE VISIT (OUTPATIENT)
Dept: PODIATRY | Facility: OTHER | Age: 67
End: 2022-01-26
Payer: COMMERCIAL

## 2022-01-26 VITALS
BODY MASS INDEX: 21.71 KG/M2 | DIASTOLIC BLOOD PRESSURE: 80 MMHG | SYSTOLIC BLOOD PRESSURE: 128 MMHG | WEIGHT: 118 LBS | HEIGHT: 62 IN

## 2022-01-26 DIAGNOSIS — I73.00 RAYNAUD'S PHENOMENON WITHOUT GANGRENE: Primary | ICD-10-CM

## 2022-01-26 PROCEDURE — 99213 OFFICE O/P EST LOW 20 MIN: CPT | Performed by: PODIATRIST

## 2022-01-26 ASSESSMENT — MIFFLIN-ST. JEOR: SCORE: 1038.62

## 2022-01-26 ASSESSMENT — PAIN SCALES - GENERAL: PAINLEVEL: NO PAIN (0)

## 2022-01-26 NOTE — PROGRESS NOTES
Chief Complaint   Patient presents with     RECHECK     pain in her toes wakes her at night, Left foot edema, raynauds, onset May 2019; LOV 3/16/2021     HPI:  Patient has continued questions and concerns about Raynaud's phenomenon.  She notes that she has had further changes in the last to 3 weeks then prior and has many questions that she would like to discuss and have answered.  She notices a ruborous appearance dark gray appearance at times to the tips of her toes left greater than right.  She notes that she is had an AVM surgically removed decades ago from her lungs.  Describes sometimes pain swelling discoloration edema in the tips of several toes worse after exposure to temperature gradients.    Retired.    ROS:  10 point ROS neg other than the symptoms noted above in the HPI.    Patient Active Problem List   Diagnosis     Constipation     Post-menopausal     S/P exploratory laparotomy     Advanced care planning/counseling discussion       PAST MEDICAL HISTORY:   Past Medical History:   Diagnosis Date     AVM (arteriovenous malformation)         PAST SURGICAL HISTORY:   Past Surgical History:   Procedure Laterality Date     ABDOMEN SURGERY  06/2018    Exploratory Lap - internal hernia reduction, appendectomy, cecopexy.  Dr. June Delatorre     APPENDECTOMY  06/2018     AS ESOPHAGOSCOPY, DIAGNOSTIC  12/14/2012     AS HYSTEROSCOPY, SURGICAL; W/ ENDOMETRIAL ABLATION, ANY METHOD       BREAST BIOPSY, CORE RT/LT  2003    Benign     COLONOSCOPY      12/14/2012. 12/17/2018, 09/17/2020     IR OR ANGIOGRAM  08/06/2018    [facility] Dr. Luevano     LUMPECTOMY BREAST  2004     TONSILLECTOMY       VASCULAR SURGERY  06/2018        MEDICATIONS: No current outpatient medications on file.     ALLERGIES:  No Known Allergies     SOCIAL HISTORY:   Social History     Socioeconomic History     Marital status:      Spouse name: Not on file     Number of children: Not on file     Years of education: Not on file      "Highest education level: Not on file   Occupational History     Not on file   Tobacco Use     Smoking status: Never Smoker     Smokeless tobacco: Never Used   Substance and Sexual Activity     Alcohol use: Never     Drug use: Never     Sexual activity: Not Currently   Other Topics Concern     Parent/sibling w/ CABG, MI or angioplasty before 65F 55M? No   Social History Narrative     Not on file     Social Determinants of Health     Financial Resource Strain: Not on file   Food Insecurity: Not on file   Transportation Needs: Not on file   Physical Activity: Not on file   Stress: Not on file   Social Connections: Not on file   Intimate Partner Violence: Not on file   Housing Stability: Not on file        FAMILY HISTORY:   Family History   Problem Relation Age of Onset     Breast Cancer Sister      Breast Cancer Sister      No Known Problems Mother      Heart Disease Father      Alzheimer Disease Father      Cancer Paternal Grandmother         EXAM:Vitals: /80 (BP Location: Right arm, Patient Position: Sitting, Cuff Size: Adult Regular)   Ht 1.575 m (5' 2.01\")   Wt 53.5 kg (118 lb)   BMI 21.58 kg/m    BMI= Body mass index is 21.58 kg/m .    General appearance: Patient is alert and fully cooperative with history & exam.  No sign of distress is noted during the visit.     Psychiatric: Affect is pleasant & appropriate.  Patient appears motivated to improve health.     Respiratory: Breathing is regular & unlabored while sitting.     HEENT: Hearing is intact to spoken word.  Speech is clear.  No gross evidence of visual impairment that would impact ambulation.     Vascular: DP & PT pulses are intact & regular bilaterally.  Temperature is warm to cool proximal to distal.  Dependent rubor is noted on all of the digits distally throughout the entire distal phalanx on the left foot and less so about the right foot only at the distal aspect of the right foot.  Most of this discoloration and purplish dependent rubor is " noted about the 5 digits on the left foot and it is mildly painful on the left second toe.  There is subtle induration about the distal phalanx on both feet toes.    Neurologic: Lower extremity sensation is intact to light touch.  No evidence of weakness or contracture in the lower extremities.  No evidence of neuropathy.    Dermatologic: Skin is intact to both lower extremities with adequate texture, turgor and tone about the integument.  Mildly diminished hair growth noted about the digits.  No paronychia or evidence of soft tissue infection is noted.     Musculoskeletal: Patient is ambulatory without assistive device or brace.  Negative anterior drawer at the MPJ.  No contracture of the lesser metatarsal phalangeal joints or PIPJ or DIPJ.  Manual muscle strength +5/5 to all 4 quadrants.  Mild hallux valgus noted with fairly longer prominent second metatarsal phalangeal joint however most of her symptoms are associated about the distal digit without hyperkeratosis.     ASSESSMENT:       ICD-10-CM    1. Raynaud's phenomenon without gangrene  I73.00         PLAN:  Reviewed patient's chart in [facility].      3/16/2021   Discussed etiology and treatment options regarding Raynaud's phenomenon.  Recommended avoiding temperature gradients.  Written instructions dispensed.  Discussed etiology and treatment options and typical progression over time.      Also discussed presentation of hallux valgus and overuse overloading of the second MPJ likely contributing to some of her symptoms and discussed appropriate shoe gear arch support and how and when to address this.  All questions were answered.  Written instructions dispensed.  Follow-up as needed.    1/26/2022  Again reviewed etiology and treatment options regarding Raynaud's phenomenon Raynaud's syndrome.  Recommended avoiding temperature gradients and where we typically are exposed to temperature gradients.  Written instructions dispensed.  We discussed etiology and treatment  options and progression over time.  We discussed alternative treatment options including vasopressors.  We reviewed appropriate shoe gear and answered all questions that she has today.  Follow-up as needed      Jose Barnes DPM

## 2022-01-26 NOTE — LETTER
1/26/2022         RE: Duke Jimenez  [id number] [facility] [address]        Dear Colleague,    Thank you for referring your patient, Duke Jimenez, to the [facility]. Please see a copy of my visit note below.    Chief Complaint   Patient presents with     RECHECK     pain in her toes wakes her at night, Left foot edema, raynauds, onset May 2019; LOV 3/16/2021     HPI:  Patient has continued questions and concerns about Raynaud's phenomenon.  She notes that she has had further changes in the last to 3 weeks then prior and has many questions that she would like to discuss and have answered.  She notices a ruborous appearance dark gray appearance at times to the tips of her toes left greater than right.  She notes that she is had an AVM surgically removed decades ago from her lungs.  Describes sometimes pain swelling discoloration edema in the tips of several toes worse after exposure to temperature gradients.    Retired.    ROS:  10 point ROS neg other than the symptoms noted above in the HPI.    Patient Active Problem List   Diagnosis     Constipation     Post-menopausal     S/P exploratory laparotomy     Advanced care planning/counseling discussion       PAST MEDICAL HISTORY:   Past Medical History:   Diagnosis Date     AVM (arteriovenous malformation)         PAST SURGICAL HISTORY:   Past Surgical History:   Procedure Laterality Date     ABDOMEN SURGERY  06/2018    Exploratory Lap - internal hernia reduction, appendectomy, cecopexy.  Dr. June Delatorre     APPENDECTOMY  06/2018     AS ESOPHAGOSCOPY, DIAGNOSTIC  12/14/2012     AS HYSTEROSCOPY, SURGICAL; W/ ENDOMETRIAL ABLATION, ANY METHOD       BREAST BIOPSY, CORE RT/LT  2003    Benign     COLONOSCOPY      12/14/2012. 12/17/2018, 09/17/2020     IR OR ANGIOGRAM  08/06/2018    [facility] Dr. Luevano     LUMPECTOMY BREAST  2004     TONSILLECTOMY       VASCULAR SURGERY  06/2018        MEDICATIONS: No current outpatient  "medications on file.     ALLERGIES:  No Known Allergies     SOCIAL HISTORY:   Social History     Socioeconomic History     Marital status:      Spouse name: Not on file     Number of children: Not on file     Years of education: Not on file     Highest education level: Not on file   Occupational History     Not on file   Tobacco Use     Smoking status: Never Smoker     Smokeless tobacco: Never Used   Substance and Sexual Activity     Alcohol use: Never     Drug use: Never     Sexual activity: Not Currently   Other Topics Concern     Parent/sibling w/ CABG, MI or angioplasty before 65F 55M? No   Social History Narrative     Not on file     Social Determinants of Health     Financial Resource Strain: Not on file   Food Insecurity: Not on file   Transportation Needs: Not on file   Physical Activity: Not on file   Stress: Not on file   Social Connections: Not on file   Intimate Partner Violence: Not on file   Housing Stability: Not on file        FAMILY HISTORY:   Family History   Problem Relation Age of Onset     Breast Cancer Sister      Breast Cancer Sister      No Known Problems Mother      Heart Disease Father      Alzheimer Disease Father      Cancer Paternal Grandmother         EXAM:Vitals: /80 (BP Location: Right arm, Patient Position: Sitting, Cuff Size: Adult Regular)   Ht 1.575 m (5' 2.01\")   Wt 53.5 kg (118 lb)   BMI 21.58 kg/m    BMI= Body mass index is 21.58 kg/m .    General appearance: Patient is alert and fully cooperative with history & exam.  No sign of distress is noted during the visit.     Psychiatric: Affect is pleasant & appropriate.  Patient appears motivated to improve health.     Respiratory: Breathing is regular & unlabored while sitting.     HEENT: Hearing is intact to spoken word.  Speech is clear.  No gross evidence of visual impairment that would impact ambulation.     Vascular: DP & PT pulses are intact & regular bilaterally.  Temperature is warm to cool proximal to " distal.  Dependent rubor is noted on all of the digits distally throughout the entire distal phalanx on the left foot and less so about the right foot only at the distal aspect of the right foot.  Most of this discoloration and purplish dependent rubor is noted about the 5 digits on the left foot and it is mildly painful on the left second toe.  There is subtle induration about the distal phalanx on both feet toes.    Neurologic: Lower extremity sensation is intact to light touch.  No evidence of weakness or contracture in the lower extremities.  No evidence of neuropathy.    Dermatologic: Skin is intact to both lower extremities with adequate texture, turgor and tone about the integument.  Mildly diminished hair growth noted about the digits.  No paronychia or evidence of soft tissue infection is noted.     Musculoskeletal: Patient is ambulatory without assistive device or brace.  Negative anterior drawer at the MPJ.  No contracture of the lesser metatarsal phalangeal joints or PIPJ or DIPJ.  Manual muscle strength +5/5 to all 4 quadrants.  Mild hallux valgus noted with fairly longer prominent second metatarsal phalangeal joint however most of her symptoms are associated about the distal digit without hyperkeratosis.     ASSESSMENT:       ICD-10-CM    1. Raynaud's phenomenon without gangrene  I73.00         PLAN:  Reviewed patient's chart in [employer].      3/16/2021   Discussed etiology and treatment options regarding Raynaud's phenomenon.  Recommended avoiding temperature gradients.  Written instructions dispensed.  Discussed etiology and treatment options and typical progression over time.      Also discussed presentation of hallux valgus and overuse overloading of the second MPJ likely contributing to some of her symptoms and discussed appropriate shoe gear arch support and how and when to address this.  All questions were answered.  Written instructions dispensed.  Follow-up as needed.    1/26/2022  Again reviewed  etiology and treatment options regarding Raynaud's phenomenon Raynaud's syndrome.  Recommended avoiding temperature gradients and where we typically are exposed to temperature gradients.  Written instructions dispensed.  We discussed etiology and treatment options and progression over time.  We discussed alternative treatment options including vasopressors.  We reviewed appropriate shoe gear and answered all questions that she has today.  Follow-up as needed      Jose Barnes DPM            Again, thank you for allowing me to participate in the care of your patient.        Sincerely,        Jose Barnes DPM

## 2022-04-19 ENCOUNTER — ANCILLARY PROCEDURE (OUTPATIENT)
Dept: MAMMOGRAPHY | Facility: OTHER | Age: 67
End: 2022-04-19
Attending: PHYSICIAN ASSISTANT
Payer: COMMERCIAL

## 2022-04-19 DIAGNOSIS — Z12.31 VISIT FOR SCREENING MAMMOGRAM: ICD-10-CM

## 2022-04-19 PROCEDURE — 77063 BREAST TOMOSYNTHESIS BI: CPT | Mod: TC | Performed by: RADIOLOGY

## 2022-04-19 PROCEDURE — 77067 SCR MAMMO BI INCL CAD: CPT | Mod: TC | Performed by: RADIOLOGY

## 2022-04-22 ASSESSMENT — ENCOUNTER SYMPTOMS
DIZZINESS: 0
HEMATOCHEZIA: 0
EYE PAIN: 0
PALPITATIONS: 0
MYALGIAS: 0
SORE THROAT: 0
DIARRHEA: 0
WEAKNESS: 0
CONSTIPATION: 0
ARTHRALGIAS: 0
HEADACHES: 0
CHILLS: 0
ABDOMINAL PAIN: 0
NERVOUS/ANXIOUS: 0
FREQUENCY: 0
NAUSEA: 0
JOINT SWELLING: 0
SHORTNESS OF BREATH: 0
HEMATURIA: 0
COUGH: 0
PARESTHESIAS: 0
FEVER: 0
HEARTBURN: 0
DYSURIA: 0
BREAST MASS: 0

## 2022-04-22 ASSESSMENT — ACTIVITIES OF DAILY LIVING (ADL): CURRENT_FUNCTION: NO ASSISTANCE NEEDED

## 2022-04-24 ASSESSMENT — ACTIVITIES OF DAILY LIVING (ADL): CURRENT_FUNCTION: NO ASSISTANCE NEEDED

## 2022-04-24 ASSESSMENT — ENCOUNTER SYMPTOMS
HEARTBURN: 0
ARTHRALGIAS: 0
DYSURIA: 0
FREQUENCY: 0
SORE THROAT: 0
NAUSEA: 0
PARESTHESIAS: 0
DIARRHEA: 0
CONSTIPATION: 0
WEAKNESS: 0
FEVER: 0
EYE PAIN: 0
COUGH: 0
DIZZINESS: 0
HEMATOCHEZIA: 0
ABDOMINAL PAIN: 0
JOINT SWELLING: 0
SHORTNESS OF BREATH: 0
BREAST MASS: 0
NERVOUS/ANXIOUS: 0
PALPITATIONS: 0
MYALGIAS: 0
HEADACHES: 0
CHILLS: 0
HEMATURIA: 0

## 2022-04-25 ENCOUNTER — OFFICE VISIT (OUTPATIENT)
Dept: FAMILY MEDICINE | Facility: OTHER | Age: 67
End: 2022-04-25
Payer: COMMERCIAL

## 2022-04-25 VITALS
WEIGHT: 119 LBS | HEART RATE: 68 BPM | HEIGHT: 62 IN | TEMPERATURE: 97.8 F | BODY MASS INDEX: 21.9 KG/M2 | DIASTOLIC BLOOD PRESSURE: 80 MMHG | SYSTOLIC BLOOD PRESSURE: 134 MMHG | OXYGEN SATURATION: 100 % | RESPIRATION RATE: 20 BRPM

## 2022-04-25 DIAGNOSIS — Z13.220 SCREENING FOR HYPERLIPIDEMIA: ICD-10-CM

## 2022-04-25 DIAGNOSIS — Z00.00 ENCOUNTER FOR MEDICARE ANNUAL WELLNESS EXAM: Primary | ICD-10-CM

## 2022-04-25 DIAGNOSIS — Z23 NEED FOR PNEUMOCOCCAL VACCINATION: ICD-10-CM

## 2022-04-25 DIAGNOSIS — Z78.0 POSTMENOPAUSAL ESTROGEN DEFICIENCY: ICD-10-CM

## 2022-04-25 DIAGNOSIS — Z13.1 SCREENING FOR DIABETES MELLITUS: ICD-10-CM

## 2022-04-25 LAB
CHOLEST SERPL-MCNC: 240 MG/DL
FASTING STATUS PATIENT QL REPORTED: NO
FASTING STATUS PATIENT QL REPORTED: NO
GLUCOSE BLD-MCNC: 85 MG/DL (ref 70–99)
HDLC SERPL-MCNC: 82 MG/DL
LDLC SERPL CALC-MCNC: 122 MG/DL
NONHDLC SERPL-MCNC: 158 MG/DL
TRIGL SERPL-MCNC: 180 MG/DL

## 2022-04-25 PROCEDURE — 90732 PPSV23 VACC 2 YRS+ SUBQ/IM: CPT | Performed by: PHYSICIAN ASSISTANT

## 2022-04-25 PROCEDURE — 80061 LIPID PANEL: CPT | Performed by: PHYSICIAN ASSISTANT

## 2022-04-25 PROCEDURE — G0402 INITIAL PREVENTIVE EXAM: HCPCS | Performed by: PHYSICIAN ASSISTANT

## 2022-04-25 PROCEDURE — 82947 ASSAY GLUCOSE BLOOD QUANT: CPT | Performed by: PHYSICIAN ASSISTANT

## 2022-04-25 PROCEDURE — G0009 ADMIN PNEUMOCOCCAL VACCINE: HCPCS | Performed by: PHYSICIAN ASSISTANT

## 2022-04-25 PROCEDURE — 36415 COLL VENOUS BLD VENIPUNCTURE: CPT | Performed by: PHYSICIAN ASSISTANT

## 2022-04-25 ASSESSMENT — PAIN SCALES - GENERAL: PAINLEVEL: NO PAIN (0)

## 2022-04-25 NOTE — PROGRESS NOTES
"SUBJECTIVE:   Duke Jimenez is a 67 year old female who presents for Preventive Visit.      Patient has been advised of split billing requirements and indicates understanding: Yes  Are you in the first 12 months of your Medicare coverage?  YES- L: 10/25 R: 10/25 B:10/20    Healthy Habits:     In general, how would you rate your overall health?  Excellent    Frequency of exercise:  4-5 days/week    Duration of exercise:  30-45 minutes    Do you usually eat at least 4 servings of fruit and vegetables a day, include whole grains    & fiber and avoid regularly eating high fat or \"junk\" foods?  Yes    Taking medications regularly:  Yes    Medication side effects:  None    Ability to successfully perform activities of daily living:  No assistance needed    Home Safety:  No safety concerns identified    Hearing Impairment:  No hearing concerns    In the past 6 months, have you been bothered by leaking of urine?  No    In general, how would you rate your overall mental or emotional health?  Excellent      PHQ-2 Total Score: 0    Additional concerns today:  No    Do you feel safe in your environment? No    Have you ever done Advance Care Planning? (For example, a Health Directive, POLST, or a discussion with a medical provider or your loved ones about your wishes): No, advance care planning information given to patient to review.  Advanced care planning was discussed at today's visit.       Fall risk  Fallen 2 or more times in the past year?: No  Any fall with injury in the past year?: No    Cognitive Screening   1) Repeat 3 items (Leader, Season, Table)    2) Clock draw: NORMAL  3) 3 item recall: Recalls 3 objects  Results: 3 items recalled: COGNITIVE IMPAIRMENT LESS LIKELY    Mini-CogTM Copyright MAISHA Silva. Licensed by the author for use in [facility]; reprinted with permission (angela@.[email or web address]). All rights reserved.      Do you have sleep apnea, excessive snoring or daytime drowsiness?: no    Reviewed and updated " as needed this visit by clinical staff   Tobacco  Allergies  Meds   Med Hx  Surg Hx  Fam Hx  Soc Hx          Reviewed and updated as needed this visit by Provider                   Social History     Tobacco Use     Smoking status: Never Smoker     Smokeless tobacco: Never Used   Substance Use Topics     Alcohol use: Never     If you drink alcohol do you typically have >3 drinks per day or >7 drinks per week? No    Alcohol Use 4/22/2022   Prescreen: >3 drinks/day or >7 drinks/week? No     Current providers sharing in care for this patient include:   Patient Care Team:  Oscar Garcia PA-C as PCP - General (Family Medicine)  Oscar Garcia PA-C as Assigned PCP  Jose Barnes DPM as Assigned Musculoskeletal Provider    The following health maintenance items are reviewed in Epic and correct as of today:  Health Maintenance Due   Topic Date Due     DEXA  Never done     INFLUENZA VACCINE (1) Never done     COVID-19 Vaccine (3 - Booster for Pfizer series) 11/01/2021     FALL RISK ASSESSMENT  Never done     ANNUAL REVIEW OF HM ORDERS  03/10/2022     Pneumococcal Vaccine: 65+ Years (1 of 1 - PPSV23) 03/03/2022     BP Readings from Last 3 Encounters:   04/25/22 134/80   01/26/22 128/80   05/13/21 136/83    Wt Readings from Last 3 Encounters:   04/25/22 54 kg (119 lb)   01/26/22 53.5 kg (118 lb)   03/16/21 50.3 kg (111 lb)                  Patient Active Problem List   Diagnosis     Constipation     Post-menopausal     S/P exploratory laparotomy     Advanced care planning/counseling discussion     Past Surgical History:   Procedure Laterality Date     ABDOMEN SURGERY  06/2018    Exploratory Lap - internal hernia reduction, appendectomy, cecopexy.  Dr. June Delatorre     APPENDECTOMY  06/2018     AS ESOPHAGOSCOPY, DIAGNOSTIC  12/14/2012     AS HYSTEROSCOPY, SURGICAL; W/ ENDOMETRIAL ABLATION, ANY METHOD       BREAST BIOPSY, CORE RT/LT  2003    Benign     COLONOSCOPY      12/14/2012. 12/17/2018, 09/17/2020     IR OR  ANGIOGRAM  08/06/2018    [facility] Dr. Luveano     LUMPECTOMY BREAST  2004     TONSILLECTOMY       VASCULAR SURGERY  06/2018       Social History     Tobacco Use     Smoking status: Never Smoker     Smokeless tobacco: Never Used   Substance Use Topics     Alcohol use: Never     Family History   Problem Relation Age of Onset     Breast Cancer Sister      Breast Cancer Sister      No Known Problems Mother      Heart Disease Father      Alzheimer Disease Father      Cancer Paternal Grandmother          No current outpatient medications on file.     No Known Allergies  Pneumonia Vaccine:Given Pneumovax today.     Breast CA Risk Assessment (FHS-7) 3/9/2021 4/22/2022   Do you have a family history of breast, colon, or ovarian cancer? Yes No / Unknown         Mammogram Screening - Alternate mammogram schedule due to breast cancer history in family, will continue to do them annually.   Pertinent mammograms are reviewed under the imaging tab.    Review of Systems   Constitutional: Negative for chills and fever.   HENT: Negative for congestion, ear pain, hearing loss and sore throat.    Eyes: Negative for pain and visual disturbance.   Respiratory: Negative for cough and shortness of breath.    Cardiovascular: Negative for chest pain, palpitations and peripheral edema.   Gastrointestinal: Negative for abdominal pain, constipation, diarrhea, heartburn, hematochezia and nausea.   Breasts:  Negative for tenderness, breast mass and discharge.   Genitourinary: Negative for dysuria, frequency, genital sores, hematuria, pelvic pain, urgency, vaginal bleeding and vaginal discharge.   Musculoskeletal: Negative for arthralgias, joint swelling and myalgias.   Skin: Negative for rash.   Neurological: Negative for dizziness, weakness, headaches and paresthesias.   Psychiatric/Behavioral: Negative for mood changes. The patient is not nervous/anxious.      OBJECTIVE:   /80   Pulse 68   Temp 97.8  F (36.6  C) (Temporal)    "Resp 20   Ht 1.575 m (5' 2.01\")   Wt 54 kg (119 lb)   SpO2 100%   BMI 21.76 kg/m   Estimated body mass index is 21.76 kg/m  as calculated from the following:    Height as of this encounter: 1.575 m (5' 2.01\").    Weight as of this encounter: 54 kg (119 lb).  Physical Exam  GENERAL: healthy, alert and no distress  EYES: Eyes grossly normal to inspection, PERRL and conjunctivae and sclerae normal  HENT: ear canals and TM's normal, nose and mouth without ulcers or lesions  NECK: no adenopathy, no asymmetry, masses, or scars and thyroid normal to palpation  RESP: lungs clear to auscultation - no rales, rhonchi or wheezes  BREAST: normal without masses, tenderness or nipple discharge and no palpable axillary masses or adenopathy  CV: regular rate and rhythm, normal S1 S2, no S3 or S4, no murmur, click or rub, no peripheral edema and peripheral pulses strong  ABDOMEN: soft, nontender, no hepatosplenomegaly, no masses and bowel sounds normal  MS: no gross musculoskeletal defects noted, no edema  SKIN: no suspicious lesions or rashes  NEURO: Normal strength and tone, mentation intact and speech normal  PSYCH: mentation appears normal, affect normal/bright    Diagnostic Test Results:  Labs reviewed in Epic  No results found for this or any previous visit (from the past 24 hour(s)).    ASSESSMENT / PLAN:       ICD-10-CM    1. Need for pneumococcal vaccination  Z23 PPSV23, IM/SUBQ (2+ YRS) - [id number]   2. Encounter for Medicare annual wellness exam  Z00.00    3. Screening for hyperlipidemia  Z13.220 Lipid panel reflex to direct LDL Fasting   4. Screening for diabetes mellitus  Z13.1 Glucose   5. Postmenopausal estrogen deficiency  Z78.0 DEXA HIP/PELVIS/SPINE - Future       Patient has been advised of split billing requirements and indicates understanding: Yes    COUNSELING:  Reviewed preventive health counseling, as reflected in patient instructions    Estimated body mass index is 21.76 kg/m  as calculated from the " "following:    Height as of this encounter: 1.575 m (5' 2.01\").    Weight as of this encounter: 54 kg (119 lb).        She reports that she has never smoked. She has never used smokeless tobacco.      Appropriate preventive services were discussed with this patient, including applicable screening as appropriate for cardiovascular disease, diabetes, osteopenia/osteoporosis, and glaucoma.  As appropriate for age/gender, discussed screening for colorectal cancer, prostate cancer, breast cancer, and cervical cancer. Checklist reviewing preventive services available has been given to the patient.    Reviewed patients plan of care and provided an AVS. The Basic Care Plan (routine screening as documented in Health Maintenance) for Duke meets the Care Plan requirement. This Care Plan has been established and reviewed with the Patient.    Counseling Resources:  ATP IV Guidelines  Pooled Cohorts Equation Calculator  Breast Cancer Risk Calculator  Breast Cancer: Medication to Reduce Risk  FRAX Risk Assessment  ICSI Preventive Guidelines  Dietary Guidelines for Americans, 2010  USDA's MyPlate  ASA Prophylaxis  Lung CA Screening    Oscar Garcia PA-C  [facility]    Identified Health Risks:  "

## 2022-04-25 NOTE — PATIENT INSTRUCTIONS
Patient Education   Personalized Prevention Plan  You are due for the preventive services outlined below.  Your care team is available to assist you in scheduling these services.  If you have already completed any of these items, please share that information with your care team to update in your medical record.  Health Maintenance Due   Topic Date Due     Osteoporosis Screening  Never done     Flu Vaccine (1) Never done     COVID-19 Vaccine (3 - Booster for Pfizer series) 11/01/2021     FALL RISK ASSESSMENT  Never done     ANNUAL REVIEW OF HM ORDERS  03/10/2022     Pneumococcal Vaccine (1 of 1 - PPSV23) 03/03/2022

## 2022-05-03 ENCOUNTER — HOSPITAL ENCOUNTER (OUTPATIENT)
Dept: BONE DENSITY | Facility: CLINIC | Age: 67
Discharge: HOME | End: 2022-05-03
Attending: PHYSICIAN ASSISTANT | Admitting: PHYSICIAN ASSISTANT
Payer: COMMERCIAL

## 2022-05-03 DIAGNOSIS — Z78.0 POSTMENOPAUSAL ESTROGEN DEFICIENCY: ICD-10-CM

## 2022-05-03 PROCEDURE — 77080 DXA BONE DENSITY AXIAL: CPT

## 2022-09-24 ENCOUNTER — HEALTH MAINTENANCE LETTER (OUTPATIENT)
Age: 67
End: 2022-09-24

## 2023-04-05 ENCOUNTER — ANCILLARY PROCEDURE (OUTPATIENT)
Dept: MAMMOGRAPHY | Facility: OTHER | Age: 68
End: 2023-04-05
Attending: PHYSICIAN ASSISTANT
Payer: COMMERCIAL

## 2023-04-05 DIAGNOSIS — Z12.31 ENCOUNTER FOR SCREENING MAMMOGRAM FOR MALIGNANT NEOPLASM OF BREAST: ICD-10-CM

## 2023-04-05 PROCEDURE — 77063 BREAST TOMOSYNTHESIS BI: CPT | Mod: TC | Performed by: RADIOLOGY

## 2023-04-05 PROCEDURE — 77067 SCR MAMMO BI INCL CAD: CPT | Mod: TC | Performed by: RADIOLOGY

## 2023-04-30 ASSESSMENT — ENCOUNTER SYMPTOMS
EYE PAIN: 0
NAUSEA: 0
COUGH: 0
DIZZINESS: 0
FEVER: 0
DYSURIA: 0
DIARRHEA: 0
CHILLS: 0
BREAST MASS: 0
WEAKNESS: 0
HEMATURIA: 0
PARESTHESIAS: 0
SORE THROAT: 0
HEADACHES: 0
HEMATOCHEZIA: 0
MYALGIAS: 0
ARTHRALGIAS: 0
PALPITATIONS: 0
JOINT SWELLING: 0
FREQUENCY: 0
NERVOUS/ANXIOUS: 0
SHORTNESS OF BREATH: 0
HEARTBURN: 0
ABDOMINAL PAIN: 0
CONSTIPATION: 0

## 2023-04-30 ASSESSMENT — ACTIVITIES OF DAILY LIVING (ADL): CURRENT_FUNCTION: NO ASSISTANCE NEEDED

## 2023-05-01 ENCOUNTER — OFFICE VISIT (OUTPATIENT)
Dept: FAMILY MEDICINE | Facility: OTHER | Age: 68
End: 2023-05-01
Payer: COMMERCIAL

## 2023-05-01 VITALS
OXYGEN SATURATION: 93 % | RESPIRATION RATE: 16 BRPM | WEIGHT: 114 LBS | HEART RATE: 67 BPM | SYSTOLIC BLOOD PRESSURE: 138 MMHG | DIASTOLIC BLOOD PRESSURE: 78 MMHG | HEIGHT: 62 IN | TEMPERATURE: 97.5 F | BODY MASS INDEX: 20.98 KG/M2

## 2023-05-01 DIAGNOSIS — Z00.00 ENCOUNTER FOR MEDICARE ANNUAL WELLNESS EXAM: Primary | ICD-10-CM

## 2023-05-01 DIAGNOSIS — Z23 NEED FOR PNEUMOCOCCAL VACCINATION: ICD-10-CM

## 2023-05-01 DIAGNOSIS — Z13.1 SCREENING FOR DIABETES MELLITUS: ICD-10-CM

## 2023-05-01 DIAGNOSIS — Z13.220 SCREENING FOR HYPERLIPIDEMIA: ICD-10-CM

## 2023-05-01 LAB
CHOLEST SERPL-MCNC: 238 MG/DL
FASTING STATUS PATIENT QL REPORTED: NO
GLUCOSE SERPL-MCNC: 91 MG/DL (ref 70–99)
HDLC SERPL-MCNC: 84 MG/DL
LDLC SERPL CALC-MCNC: 105 MG/DL
NONHDLC SERPL-MCNC: 154 MG/DL
TRIGL SERPL-MCNC: 243 MG/DL

## 2023-05-01 PROCEDURE — 36415 COLL VENOUS BLD VENIPUNCTURE: CPT | Performed by: PHYSICIAN ASSISTANT

## 2023-05-01 PROCEDURE — G0009 ADMIN PNEUMOCOCCAL VACCINE: HCPCS | Performed by: PHYSICIAN ASSISTANT

## 2023-05-01 PROCEDURE — G0438 PPPS, INITIAL VISIT: HCPCS | Performed by: PHYSICIAN ASSISTANT

## 2023-05-01 PROCEDURE — 80061 LIPID PANEL: CPT | Performed by: PHYSICIAN ASSISTANT

## 2023-05-01 PROCEDURE — 90677 PCV20 VACCINE IM: CPT | Performed by: PHYSICIAN ASSISTANT

## 2023-05-01 PROCEDURE — 82947 ASSAY GLUCOSE BLOOD QUANT: CPT | Performed by: PHYSICIAN ASSISTANT

## 2023-05-01 ASSESSMENT — ENCOUNTER SYMPTOMS
ABDOMINAL PAIN: 0
EYE PAIN: 0
DIZZINESS: 0
JOINT SWELLING: 0
NERVOUS/ANXIOUS: 0
HEARTBURN: 0
COUGH: 0
HEADACHES: 0
HEMATOCHEZIA: 0
NAUSEA: 0
MYALGIAS: 0
ARTHRALGIAS: 0
SHORTNESS OF BREATH: 0
WEAKNESS: 0
FREQUENCY: 0
SORE THROAT: 0
DYSURIA: 0
FEVER: 0
PARESTHESIAS: 0
HEMATURIA: 0
DIARRHEA: 0
CHILLS: 0
PALPITATIONS: 0
CONSTIPATION: 0
BREAST MASS: 0

## 2023-05-01 ASSESSMENT — ACTIVITIES OF DAILY LIVING (ADL): CURRENT_FUNCTION: NO ASSISTANCE NEEDED

## 2023-05-01 ASSESSMENT — PAIN SCALES - GENERAL: PAINLEVEL: NO PAIN (0)

## 2023-05-01 NOTE — PROGRESS NOTES
Prior to immunization administration, verified patients identity using patient s name and date of birth. Please see Immunization Activity for additional information.     Screening Questionnaire for Adult Immunization    Are you sick today?   No   Do you have allergies to medications, food, a vaccine component or latex?   No   Have you ever had a serious reaction after receiving a vaccination?   No   Do you have a long-term health problem with heart, lung, kidney, or metabolic disease (e.g., diabetes), asthma, a blood disorder, no spleen, complement component deficiency, a cochlear implant, or a spinal fluid leak?  Are you on long-term aspirin therapy?   No   Do you have cancer, leukemia, HIV/AIDS, or any other immune system problem?   No   Do you have a parent, brother, or sister with an immune system problem?   No   In the past 3 months, have you taken medications that affect  your immune system, such as prednisone, other steroids, or anticancer drugs; drugs for the treatment of rheumatoid arthritis, Crohn s disease, or psoriasis; or have you had radiation treatments?   No   Have you had a seizure, or a brain or other nervous system problem?   No   During the past year, have you received a transfusion of blood or blood    products, or been given immune (gamma) globulin or antiviral drug?   No   For women: Are you pregnant or is there a chance you could become       pregnant during the next month?   No   Have you received any vaccinations in the past 4 weeks?   No     Immunization questionnaire answers were all negative.      Injection of Prevnar 20 given by Eveline Garcia CMA. Patient instructed to remain in clinic for 15 minutes afterwards, and to report any adverse reactions.     Screening performed by Eveline Garcia CMA on 5/1/2023 at 2:45 PM.

## 2023-05-01 NOTE — PROGRESS NOTES
"SUBJECTIVE:   Duke is a 68 year old who presents for Preventive Visit.      5/1/2023     2:10 PM   Additional Questions   Roomed by Eveline CLAY   Accompanied by self   Patient has been advised of split billing requirements and indicates understanding: Yes  Are you in the first 12 months of your Medicare coverage?  No    Healthy Habits:     In general, how would you rate your overall health?  Excellent    Frequency of exercise:  4-5 days/week    Duration of exercise:  45-60 minutes    Do you usually eat at least 4 servings of fruit and vegetables a day, include whole grains    & fiber and avoid regularly eating high fat or \"junk\" foods?  Yes    Taking medications regularly:  Yes    Medication side effects:  Not applicable    Ability to successfully perform activities of daily living:  No assistance needed    Home Safety:  No safety concerns identified    Hearing Impairment:  No hearing concerns    In the past 6 months, have you been bothered by leaking of urine?  No    In general, how would you rate your overall mental or emotional health?  Excellent      PHQ-2 Total Score: 0    Additional concerns today:  No      Have you ever done Advance Care Planning? (For example, a Health Directive, POLST, or a discussion with a medical provider or your loved ones about your wishes): No, advance care planning information given to patient to review.  Patient plans to discuss their wishes with loved ones or provider.      The 10-year ASCVD risk score (Michael ABDALLA, et al., 2019) is: 6.7%    Values used to calculate the score:      Age: 68 years      Sex: Female      Is Non- : No      Diabetic: No      Tobacco smoker: No      Systolic Blood Pressure: 138 mmHg      Is BP treated: No      HDL Cholesterol: 82 mg/dL      Total Cholesterol: 240 mg/dL      Fall risk  Fallen 2 or more times in the past year?: No  Any fall with injury in the past year?: No    Cognitive Screening   1) Repeat 3 items (Leader, Season, Table) "   2) Clock draw: NORMAL  3) 3 item recall: Recalls 3 objects  Results: 3 items recalled: COGNITIVE IMPAIRMENT LESS LIKELY    Mini-CogTM Copyright MAISHA Silva. Licensed by the author for use in [facility]; reprinted with permission (angela@[facility]). All rights reserved.          Reviewed and updated as needed this visit by clinical staff   Tobacco  Allergies  Meds  Problems             Reviewed and updated as needed this visit by Provider    Allergies  Meds  Problems            Social History     Tobacco Use     Smoking status: Never     Smokeless tobacco: Never   Vaping Use     Vaping status: Never Used   Substance Use Topics     Alcohol use: Never           4/30/2023     8:45 AM   Alcohol Use   Prescreen: >3 drinks/day or >7 drinks/week? No     Do you have a current opioid prescription? No  Do you use any other controlled substances or medications that are not prescribed by a provider? None              Current providers sharing in care for this patient include:   Patient Care Team:  Oscar Garcia PA-C as PCP - General (Family Medicine)  Oscar Garcia PA-C as Assigned PCP  Jose Barnes DPM as Assigned Surgical Provider    The following health maintenance items are reviewed in Epic and correct as of today:  Health Maintenance   Topic Date Due     COVID-19 Vaccine (3 - Booster for Pfizer series) 07/27/2021     INFLUENZA VACCINE (1) Never done     ANNUAL REVIEW OF HM ORDERS  04/25/2023     MEDICARE ANNUAL WELLNESS VISIT  04/25/2023     Pneumococcal Vaccine: 65+ Years (2 - PCV) 04/25/2023     FALL RISK ASSESSMENT  05/01/2024     MAMMO SCREENING  04/05/2025     COLORECTAL CANCER SCREENING  09/17/2025     LIPID  04/25/2027     ADVANCE CARE PLANNING  04/26/2027     DTAP/TDAP/TD IMMUNIZATION (3 - Td or Tdap) 03/10/2031     DEXA  05/03/2037     HEPATITIS C SCREENING  Completed     PHQ-2 (once per calendar year)  Completed     ZOSTER IMMUNIZATION  Completed     IPV IMMUNIZATION  Aged Out      MENINGITIS IMMUNIZATION  Aged Out     PAP  Discontinued     BP Readings from Last 3 Encounters:   05/01/23 138/78   04/25/22 134/80   01/26/22 128/80    Wt Readings from Last 3 Encounters:   05/01/23 51.7 kg (114 lb)   04/25/22 54 kg (119 lb)   01/26/22 53.5 kg (118 lb)                  Patient Active Problem List   Diagnosis     Constipation     Post-menopausal     S/P exploratory laparotomy     Advanced care planning/counseling discussion     Past Surgical History:   Procedure Laterality Date     ABDOMEN SURGERY  06/2018    Exploratory Lap - internal hernia reduction, appendectomy, cecopexy.  Dr. June Delatorre     APPENDECTOMY  06/2018     AS ESOPHAGOSCOPY, DIAGNOSTIC  12/14/2012     AS HYSTEROSCOPY, SURGICAL; W/ ENDOMETRIAL ABLATION, ANY METHOD       BREAST BIOPSY, CORE RT/LT  2003    Benign     COLONOSCOPY      12/14/2012. 12/17/2018, 09/17/2020     IR OR ANGIOGRAM  08/06/2018    [facility] Dr. Luevano     LUMPECTOMY BREAST  2004     TONSILLECTOMY       VASCULAR SURGERY  06/2018       Social History     Tobacco Use     Smoking status: Never     Smokeless tobacco: Never   Vaping Use     Vaping status: Never Used   Substance Use Topics     Alcohol use: Never     Family History   Problem Relation Age of Onset     Breast Cancer Sister      Breast Cancer Sister      No Known Problems Mother      Heart Disease Father      Alzheimer Disease Father      Cancer Paternal Grandmother          No current outpatient medications on file.     No Known Allergies      Mammogram Screening: Mammogram Screening: Recommended mammography every 1-2 years with patient discussion and risk factor consideration        3/9/2021     4:25 PM 4/22/2022     8:40 AM   Breast CA Risk Assessment (FHS-7)   Do you have a family history of breast, colon, or ovarian cancer? Yes No / Unknown         Mammogram Screening: Recommended mammography every 1-2 years with patient discussion and risk factor consideration  Pertinent mammograms are  "reviewed under the imaging tab.    Review of Systems   Constitutional: Negative for chills and fever.   HENT: Negative for congestion, ear pain, hearing loss and sore throat.    Eyes: Negative for pain and visual disturbance.   Respiratory: Negative for cough and shortness of breath.    Cardiovascular: Negative for chest pain, palpitations and peripheral edema.   Gastrointestinal: Negative for abdominal pain, constipation, diarrhea, heartburn, hematochezia and nausea.   Breasts:  Negative for tenderness, breast mass and discharge.   Genitourinary: Negative for dysuria, frequency, genital sores, hematuria, pelvic pain, urgency, vaginal bleeding and vaginal discharge.   Musculoskeletal: Negative for arthralgias, joint swelling and myalgias.   Skin: Negative for rash.   Neurological: Negative for dizziness, weakness, headaches and paresthesias.   Psychiatric/Behavioral: Negative for mood changes. The patient is not nervous/anxious.          OBJECTIVE:   /78   Pulse 67   Temp 97.5  F (36.4  C) (Temporal)   Resp 16   Ht 1.577 m (5' 2.09\")   Wt 51.7 kg (114 lb)   SpO2 93%   BMI 20.79 kg/m   Estimated body mass index is 20.79 kg/m  as calculated from the following:    Height as of this encounter: 1.577 m (5' 2.09\").    Weight as of this encounter: 51.7 kg (114 lb).  Physical Exam  GENERAL: healthy, alert and no distress  EYES: Eyes grossly normal to inspection, PERRL and conjunctivae and sclerae normal  HENT: ear canals and TM's normal, nose and mouth without ulcers or lesions  NECK: no adenopathy, no asymmetry, masses, or scars and thyroid normal to palpation  RESP: lungs clear to auscultation - no rales, rhonchi or wheezes  BREAST: normal without masses, tenderness or nipple discharge and no palpable axillary masses or adenopathy  CV: regular rate and rhythm, normal S1 S2, no S3 or S4, no murmur, click or rub, no peripheral edema and peripheral pulses strong  ABDOMEN: soft, nontender, no hepatosplenomegaly, " no masses and bowel sounds normal  MS: no gross musculoskeletal defects noted, no edema  SKIN: no suspicious lesions or rashes  NEURO: Normal strength and tone, mentation intact and speech normal  PSYCH: mentation appears normal, affect normal/bright    Diagnostic Test Results:  Labs reviewed in Epic  No results found for this or any previous visit (from the past 24 hour(s)).    ASSESSMENT / PLAN:       ICD-10-CM    1. Encounter for Medicare annual wellness exam  Z00.00 PRIMARY CARE FOLLOW-UP SCHEDULING      2. Screening for hyperlipidemia  Z13.220 Lipid panel reflex to direct LDL Fasting      3. Screening for diabetes mellitus  Z13.1 Glucose      4. Need for pneumococcal vaccination  Z23 Pneumococcal 20 Valent Conjugate (PCV20)          The 10-year ASCVD risk score (Michael ABDALLA, et al., 2019) is: 6.7%    Values used to calculate the score:      Age: 68 years      Sex: Female      Is Non- : No      Diabetic: No      Tobacco smoker: No      Systolic Blood Pressure: 138 mmHg      Is BP treated: No      HDL Cholesterol: 82 mg/dL      Total Cholesterol: 240 mg/dL    We discussed this result and what the recommendations are, she would like to avoid medication, we did discuss how she can support healthy cholesterol with diet, she is already very active, and discussed supplements.  Rechecking this year.         COUNSELING:  Reviewed preventive health counseling, as reflected in patient instructions       Regular exercise       Healthy diet/nutrition       Vision screening       Osteoporosis prevention/bone health       Hepatitis C screening       (Emani)menopause management        She reports that she has never smoked. She has never used smokeless tobacco.      Appropriate preventive services were discussed with this patient, including applicable screening as appropriate for cardiovascular disease, diabetes, osteopenia/osteoporosis, and glaucoma.  As appropriate for age/gender, discussed screening for  colorectal cancer, prostate cancer, breast cancer, and cervical cancer. Checklist reviewing preventive services available has been given to the patient.    Reviewed patients plan of care and provided an AVS. The Basic Care Plan (routine screening as documented in Health Maintenance) for Duke meets the Care Plan requirement. This Care Plan has been established and reviewed with the Patient.      [facility]    Identified Health Risks:    I have reviewed Opioid Use Disorder and Substance Use Disorder risk factors and made any needed referrals.      H Plasty Text: Given the location of the defect, shape of the defect and the proximity to free margins a H-plasty was deemed most appropriate for repair.  Using a sterile surgical marker, the appropriate advancement arms of the H-plasty were drawn incorporating the defect and placing the expected incisions within the relaxed skin tension lines where possible. The area thus outlined was incised deep to adipose tissue with a #15 scalpel blade. The skin margins were undermined to an appropriate distance in all directions utilizing iris scissors.  The opposing advancement arms were then advanced into place in opposite direction and anchored with interrupted buried subcutaneous sutures.

## 2023-05-01 NOTE — PATIENT INSTRUCTIONS
Patient Education   Personalized Prevention Plan  You are due for the preventive services outlined below.  Your care team is available to assist you in scheduling these services.  If you have already completed any of these items, please share that information with your care team to update in your medical record.  Health Maintenance Due   Topic Date Due     COVID-19 Vaccine (3 - Booster for Pfizer series) 07/27/2021     Flu Vaccine (1) Never done     ANNUAL REVIEW OF HM ORDERS  04/25/2023     Annual Wellness Visit  04/25/2023     Pneumococcal Vaccine (2 - PCV) 04/25/2023         SHORTNESS OF BREATH

## 2023-10-04 ENCOUNTER — ANCILLARY PROCEDURE (OUTPATIENT)
Dept: GENERAL RADIOLOGY | Facility: OTHER | Age: 68
End: 2023-10-04
Attending: PODIATRIST
Payer: COMMERCIAL

## 2023-10-04 ENCOUNTER — OFFICE VISIT (OUTPATIENT)
Dept: PODIATRY | Facility: OTHER | Age: 68
End: 2023-10-04
Payer: COMMERCIAL

## 2023-10-04 VITALS
BODY MASS INDEX: 21.16 KG/M2 | DIASTOLIC BLOOD PRESSURE: 88 MMHG | WEIGHT: 115 LBS | HEIGHT: 62 IN | HEART RATE: 71 BPM | TEMPERATURE: 98.3 F | SYSTOLIC BLOOD PRESSURE: 149 MMHG

## 2023-10-04 DIAGNOSIS — M25.572 LEFT ANKLE PAIN: ICD-10-CM

## 2023-10-04 DIAGNOSIS — Q66.6 PES VALGUS: Primary | ICD-10-CM

## 2023-10-04 DIAGNOSIS — M19.072 PRIMARY OSTEOARTHRITIS OF LEFT ANKLE: ICD-10-CM

## 2023-10-04 PROCEDURE — 99213 OFFICE O/P EST LOW 20 MIN: CPT | Performed by: PODIATRIST

## 2023-10-04 PROCEDURE — 73610 X-RAY EXAM OF ANKLE: CPT | Mod: TC | Performed by: RADIOLOGY

## 2023-10-04 ASSESSMENT — PAIN SCALES - GENERAL: PAINLEVEL: MILD PAIN (2)

## 2023-10-04 NOTE — PATIENT INSTRUCTIONS
Reliable shoe stores: To maximize your experience and provide the best possible fit.  Be sure to show them your foot concerns and tell them Dr. Barnes sent you.      Stores listed in bold have only athletic shoes, and stores that are not bold are mostly casual or variety of shoes    [employer]  [address] [place] [address]  [phone]    [employer] [place]  [address] [place] [address]  [phone]     [employer] [place]  [address] [place] [address]  [phone]    [employer]  [address] [facility]  [facility] [address]  [phone]    [employer]  [address] [place], [place] [address] [phone]    [employer]  [address] [place] [address]  [phone]                         [employer] Locations:     [address] [place] [address]   [phone]     [address] [place] [address] [address] [place] [address]   [phone]     [address] [place] [address]   [phone]     [address] [facility] [place] [address]   [phone]     [address] [place] [address]   [phone]     [address] [place] [address]   [phone]     [address] [facility] [facility]   [place] [address]   [phone]     [address] [facility]. [address]   [place] [address]   [phone]             How to find reasonable shoes          The correct width    Correct Fitting    Correct Length      Foot Distortion    Posture Distortion                          Torsional Rigidity      Grasp behind the heel and underneath the foot and twist      Bad    Excessive torsion/twist in midfoot     Less torsion/twist in midfoot is better                   Heel Counter Rigidity      Grasp just above   midsole and squeeze      Bad    Soft heel counter      Good    Rigid Heel Counter      Flexion Rigidity      Grasp shoe and bend from forefoot to rearfoot

## 2023-10-04 NOTE — PROGRESS NOTES
HPI:  Duke Jimenez is a 68 year old female who is seen in consultation at the request of self.    Pt presents for eval of:   (Onset, Location, L/R, Character, Treatments, Injury if yes)    XR Left ankle 10/4/2023    LOV 1/26/2022 - Raynauds dx     Onset late Summer 2022, lateral and medial Left ankle pain. No injury noted. Presents with flat casual shoes.   Constant dull ache. Intermittent sharp pain @ rest. Feels the best when active.  Is usually walking about 8+ hours per day very active and this is starting to slow her down.    Retired.    ROS:  10 point ROS neg other than the symptoms noted above in the HPI.    Patient Active Problem List   Diagnosis    Constipation    Post-menopausal    S/P exploratory laparotomy    Advanced care planning/counseling discussion       PAST MEDICAL HISTORY:   Past Medical History:   Diagnosis Date    AVM (arteriovenous malformation)         PAST SURGICAL HISTORY:   Past Surgical History:   Procedure Laterality Date    ABDOMEN SURGERY  06/2018    Exploratory Lap - internal hernia reduction, appendectomy, cecopexy.  Dr. June Delatorre    APPENDECTOMY  06/2018    AS ESOPHAGOSCOPY, DIAGNOSTIC  12/14/2012    AS HYSTEROSCOPY, SURGICAL; W/ ENDOMETRIAL ABLATION, ANY METHOD      BREAST BIOPSY, CORE RT/LT  2003    Benign    COLONOSCOPY      12/14/2012. 12/17/2018, 09/17/2020    IR OR ANGIOGRAM  08/06/2018    [facility] Dr. Luevano    LUMPECTOMY BREAST  2004    TONSILLECTOMY      VASCULAR SURGERY  06/2018        MEDICATIONS: No current outpatient medications on file.     ALLERGIES:  No Known Allergies     SOCIAL HISTORY:   Social History     Socioeconomic History    Marital status:      Spouse name: Not on file    Number of children: Not on file    Years of education: Not on file    Highest education level: Not on file   Occupational History    Not on file   Tobacco Use    Smoking status: Never    Smokeless tobacco: Never   Vaping Use    Vaping Use: Never used   Substance and  "Sexual Activity    Alcohol use: Never    Drug use: Never    Sexual activity: Not Currently   Other Topics Concern    Parent/sibling w/ CABG, MI or angioplasty before 65F 55M? No   Social History Narrative    Not on file     Social Determinants of Health     Financial Resource Strain: Not on file   Food Insecurity: Not on file   Transportation Needs: Not on file   Physical Activity: Not on file   Stress: Not on file   Social Connections: Not on file   Interpersonal Safety: Not on file   Housing Stability: Not on file        FAMILY HISTORY:   Family History   Problem Relation Age of Onset    Breast Cancer Sister     Breast Cancer Sister     No Known Problems Mother     Heart Disease Father     Alzheimer Disease Father     Cancer Paternal Grandmother         EXAM:Vitals: BP (!) 149/88 (BP Location: Left arm, Patient Position: Sitting, Cuff Size: Adult Regular)   Pulse 71   Temp 98.3  F (36.8  C) (Temporal)   Ht 1.577 m (5' 2.09\")   Wt 52.2 kg (115 lb)   BMI 20.97 kg/m    BMI= Body mass index is 20.97 kg/m .    General appearance: Patient is alert and fully cooperative with history & exam.  No sign of distress is noted during the visit.     Psychiatric: Affect is pleasant & appropriate.  Patient appears motivated to improve health.     Respiratory: Breathing is regular & unlabored while sitting.     HEENT: Hearing is intact to spoken word.  Speech is clear.  No gross evidence of visual impairment that would impact ambulation.     Vascular: DP & PT pulses are intact & regular bilaterally.  No significant edema or varicosities noted.  CFT and skin temperature is normal to both lower extremities.     Neurologic: Lower extremity sensation is intact to light touch.  No evidence of weakness or contracture in the lower extremities.  No evidence of neuropathy.    Dermatologic: Skin is intact to both lower extremities with adequate texture, turgor and tone about the integument.  No paronychia or evidence of soft tissue " infection is noted.     Musculoskeletal: Patient is ambulatory without assistive device or brace.  Generalized valgus bilateral but much more valgus is noted on the left with medial column faulting.  Mostly sagittal plane deformity.  Manual muscle strength is 5/5 to all 4 quadrants.  There is limited range of motion to the left subtalar joint with subtle crepitus.  No peroneal subluxation.    Radiographs: 3 views left foot demonstrate diminished calcaneal inclination angle increased talar declination angle consistent with pes valgus.  No acute cortical reaction or joint diastases.     ASSESSMENT:       ICD-10-CM    1. Pes valgus  Q66.6 Orthotics and Prosthetics DME Orthotic; Foot Orthotics; Bilateral      2. Primary osteoarthritis of left ankle  M19.072 Orthotics and Prosthetics DME Orthotic; Foot Orthotics; Bilateral           PLAN:  Reviewed patient's chart in [facility].      10/4/2023   Obtained and interpreted radiographs  Recommended more arch support more protection to reduce fatigue and symptoms through the subtalar joint  Written instructions regarding proper shoe gear for more support  Order for custom molded orthotics to provide more support  May consider oral anti-inflammatories or injection of the left subtalar joint.  Follow-up after attempting changes in shoe gear and custom orthotics  All questions were answered      Jose Barnes DPM

## 2023-10-04 NOTE — LETTER
10/4/2023         RE: Duke Jimenez  [address] [facility] [address]        Dear Colleague,    Thank you for referring your patient, Duke Jimenez, to the [facility]. Please see a copy of my visit note below.    HPI:  Duke Jimenez is a 68 year old female who is seen in consultation at the request of self.    Pt presents for eval of:   (Onset, Location, L/R, Character, Treatments, Injury if yes)    XR Left ankle 10/4/2023    LOV 1/26/2022 - Raynauds dx     Onset late Summer 2022, lateral and medial Left ankle pain. No injury noted. Presents with flat casual shoes.   Constant dull ache. Intermittent sharp pain @ rest. Feels the best when active.  Is usually walking about 8+ hours per day very active and this is starting to slow her down.    Retired.    ROS:  10 point ROS neg other than the symptoms noted above in the HPI.    Patient Active Problem List   Diagnosis     Constipation     Post-menopausal     S/P exploratory laparotomy     Advanced care planning/counseling discussion       PAST MEDICAL HISTORY:   Past Medical History:   Diagnosis Date     AVM (arteriovenous malformation)         PAST SURGICAL HISTORY:   Past Surgical History:   Procedure Laterality Date     ABDOMEN SURGERY  06/2018    Exploratory Lap - internal hernia reduction, appendectomy, cecopexy.  Dr. June Delatorre     APPENDECTOMY  06/2018     AS ESOPHAGOSCOPY, DIAGNOSTIC  12/14/2012     AS HYSTEROSCOPY, SURGICAL; W/ ENDOMETRIAL ABLATION, ANY METHOD       BREAST BIOPSY, CORE RT/LT  2003    Benign     COLONOSCOPY      12/14/2012. 12/17/2018, 09/17/2020     IR OR ANGIOGRAM  08/06/2018    [facility] Dr. Luevano     LUMPECTOMY BREAST  2004     TONSILLECTOMY       VASCULAR SURGERY  06/2018        MEDICATIONS: No current outpatient medications on file.     ALLERGIES:  No Known Allergies     SOCIAL HISTORY:   Social History     Socioeconomic History     Marital status:      Spouse name: Not on file      "Number of children: Not on file     Years of education: Not on file     Highest education level: Not on file   Occupational History     Not on file   Tobacco Use     Smoking status: Never     Smokeless tobacco: Never   Vaping Use     Vaping Use: Never used   Substance and Sexual Activity     Alcohol use: Never     Drug use: Never     Sexual activity: Not Currently   Other Topics Concern     Parent/sibling w/ CABG, MI or angioplasty before 65F 55M? No   Social History Narrative     Not on file     Social Determinants of Health     Financial Resource Strain: Not on file   Food Insecurity: Not on file   Transportation Needs: Not on file   Physical Activity: Not on file   Stress: Not on file   Social Connections: Not on file   Interpersonal Safety: Not on file   Housing Stability: Not on file        FAMILY HISTORY:   Family History   Problem Relation Age of Onset     Breast Cancer Sister      Breast Cancer Sister      No Known Problems Mother      Heart Disease Father      Alzheimer Disease Father      Cancer Paternal Grandmother         EXAM:Vitals: BP (!) 149/88 (BP Location: Left arm, Patient Position: Sitting, Cuff Size: Adult Regular)   Pulse 71   Temp 98.3  F (36.8  C) (Temporal)   Ht 1.577 m (5' 2.09\")   Wt 52.2 kg (115 lb)   BMI 20.97 kg/m    BMI= Body mass index is 20.97 kg/m .    General appearance: Patient is alert and fully cooperative with history & exam.  No sign of distress is noted during the visit.     Psychiatric: Affect is pleasant & appropriate.  Patient appears motivated to improve health.     Respiratory: Breathing is regular & unlabored while sitting.     HEENT: Hearing is intact to spoken word.  Speech is clear.  No gross evidence of visual impairment that would impact ambulation.     Vascular: DP & PT pulses are intact & regular bilaterally.  No significant edema or varicosities noted.  CFT and skin temperature is normal to both lower extremities.     Neurologic: Lower extremity sensation " is intact to light touch.  No evidence of weakness or contracture in the lower extremities.  No evidence of neuropathy.    Dermatologic: Skin is intact to both lower extremities with adequate texture, turgor and tone about the integument.  No paronychia or evidence of soft tissue infection is noted.     Musculoskeletal: Patient is ambulatory without assistive device or brace.  Generalized valgus bilateral but much more valgus is noted on the left with medial column faulting.  Mostly sagittal plane deformity.  Manual muscle strength is 5/5 to all 4 quadrants.  There is limited range of motion to the left subtalar joint with subtle crepitus.  No peroneal subluxation.    Radiographs: 3 views left foot demonstrate diminished calcaneal inclination angle increased talar declination angle consistent with pes valgus.  No acute cortical reaction or joint diastases.     ASSESSMENT:       ICD-10-CM    1. Pes valgus  Q66.6 Orthotics and Prosthetics DME Orthotic; Foot Orthotics; Bilateral      2. Primary osteoarthritis of left ankle  M19.072 Orthotics and Prosthetics DME Orthotic; Foot Orthotics; Bilateral           PLAN:  Reviewed patient's chart in [facility].      10/4/2023   Obtained and interpreted radiographs  Recommended more arch support more protection to reduce fatigue and symptoms through the subtalar joint  Written instructions regarding proper shoe gear for more support  Order for custom molded orthotics to provide more support  May consider oral anti-inflammatories or injection of the left subtalar joint.  Follow-up after attempting changes in shoe gear and custom orthotics  All questions were answered      Jose Barnes DPM          Again, thank you for allowing me to participate in the care of your patient.        Sincerely,        Jose Barnes DPM

## 2024-03-19 ENCOUNTER — ANCILLARY PROCEDURE (OUTPATIENT)
Dept: MAMMOGRAPHY | Facility: OTHER | Age: 69
End: 2024-03-19
Attending: PHYSICIAN ASSISTANT
Payer: COMMERCIAL

## 2024-03-19 ENCOUNTER — OFFICE VISIT (OUTPATIENT)
Dept: FAMILY MEDICINE | Facility: OTHER | Age: 69
End: 2024-03-19
Payer: COMMERCIAL

## 2024-03-19 VITALS
HEART RATE: 65 BPM | RESPIRATION RATE: 16 BRPM | BODY MASS INDEX: 21.35 KG/M2 | WEIGHT: 116 LBS | TEMPERATURE: 97.8 F | DIASTOLIC BLOOD PRESSURE: 88 MMHG | HEIGHT: 62 IN | OXYGEN SATURATION: 99 % | SYSTOLIC BLOOD PRESSURE: 130 MMHG

## 2024-03-19 DIAGNOSIS — Z13.220 SCREENING FOR HYPERLIPIDEMIA: ICD-10-CM

## 2024-03-19 DIAGNOSIS — Z00.00 ENCOUNTER FOR MEDICARE ANNUAL WELLNESS EXAM: Primary | ICD-10-CM

## 2024-03-19 DIAGNOSIS — Z12.31 VISIT FOR SCREENING MAMMOGRAM: ICD-10-CM

## 2024-03-19 DIAGNOSIS — Z13.1 SCREENING FOR DIABETES MELLITUS: ICD-10-CM

## 2024-03-19 DIAGNOSIS — M85.80 OSTEOPENIA, UNSPECIFIED LOCATION: ICD-10-CM

## 2024-03-19 LAB
CHOLEST SERPL-MCNC: 240 MG/DL
FASTING STATUS PATIENT QL REPORTED: YES
FASTING STATUS PATIENT QL REPORTED: YES
GLUCOSE SERPL-MCNC: 89 MG/DL (ref 70–99)
HDLC SERPL-MCNC: 94 MG/DL
LDLC SERPL CALC-MCNC: 136 MG/DL
NONHDLC SERPL-MCNC: 146 MG/DL
TRIGL SERPL-MCNC: 50 MG/DL

## 2024-03-19 PROCEDURE — 80061 LIPID PANEL: CPT | Performed by: PHYSICIAN ASSISTANT

## 2024-03-19 PROCEDURE — G0439 PPPS, SUBSEQ VISIT: HCPCS | Performed by: PHYSICIAN ASSISTANT

## 2024-03-19 PROCEDURE — 77067 SCR MAMMO BI INCL CAD: CPT | Mod: TC | Performed by: FAMILY MEDICINE

## 2024-03-19 PROCEDURE — 82947 ASSAY GLUCOSE BLOOD QUANT: CPT | Performed by: PHYSICIAN ASSISTANT

## 2024-03-19 PROCEDURE — 77063 BREAST TOMOSYNTHESIS BI: CPT | Mod: TC | Performed by: FAMILY MEDICINE

## 2024-03-19 PROCEDURE — 36415 COLL VENOUS BLD VENIPUNCTURE: CPT | Performed by: PHYSICIAN ASSISTANT

## 2024-03-19 RX ORDER — RESPIRATORY SYNCYTIAL VIRUS VACCINE 120MCG/0.5
0.5 KIT INTRAMUSCULAR ONCE
Qty: 1 EACH | Refills: 0 | Status: CANCELLED | OUTPATIENT
Start: 2024-03-19 | End: 2024-03-19

## 2024-03-19 SDOH — HEALTH STABILITY: PHYSICAL HEALTH: ON AVERAGE, HOW MANY DAYS PER WEEK DO YOU ENGAGE IN MODERATE TO STRENUOUS EXERCISE (LIKE A BRISK WALK)?: 7 DAYS

## 2024-03-19 ASSESSMENT — SOCIAL DETERMINANTS OF HEALTH (SDOH): HOW OFTEN DO YOU GET TOGETHER WITH FRIENDS OR RELATIVES?: THREE TIMES A WEEK

## 2024-03-19 ASSESSMENT — PAIN SCALES - GENERAL: PAINLEVEL: NO PAIN (0)

## 2024-03-19 NOTE — PATIENT INSTRUCTIONS
Preventive Care Advice   This is general advice given by our system to help you stay healthy. However, your care team may have specific advice just for you. Please talk to your care team about your preventive care needs.  Nutrition  Eat 5 or more servings of fruits and vegetables each day.  Try wheat bread, brown rice and whole grain pasta (instead of white bread, rice, and pasta).  Get enough calcium and vitamin D. Check the label on foods and aim for 100% of the RDA (recommended daily allowance).  Lifestyle  Exercise at least 150 minutes each week   (30 minutes a day, 5 days a week).  Do muscle strengthening activities 2 days a week. These help control your weight and prevent disease.  No smoking.  Wear sunscreen to prevent skin cancer.  Have a dental exam and cleaning every 6 months.  Yearly exams  See your health care team every year to talk about:  Any changes in your health.  Any medicines your care team has prescribed.  Preventive care, family planning, and ways to prevent chronic diseases.  Shots (vaccines)   HPV shots (up to age 26), if you've never had them before.  Hepatitis B shots (up to age 59), if you've never had them before.  COVID-19 shot: Get this shot when it's due.  Flu shot: Get a flu shot every year.  Tetanus shot: Get a tetanus shot every 10 years.  Pneumococcal, hepatitis A, and RSV shots: Ask your care team if you need these based on your risk.  Shingles shot (for age 50 and up).  General health tests  Diabetes screening:  Starting at age 35, Get screened for diabetes at least every 3 years.  If you are younger than age 35, ask your care team if you should be screened for diabetes.  Cholesterol test: At age 39, start having a cholesterol test every 5 years, or more often if advised.  Bone density scan (DEXA): At age 50, ask your care team if you should have this scan for osteoporosis (brittle bones).  Hepatitis C: Get tested at least once in your life.  STIs (sexually transmitted  infections)  Before age 24: Ask your care team if you should be screened for STIs.  After age 24: Get screened for STIs if you're at risk. You are at risk for STIs (including HIV) if:  You are sexually active with more than one person.  You don't use condoms every time.  You or a partner was diagnosed with a sexually transmitted infection.  If you are at risk for HIV, ask about PrEP medicine to prevent HIV.  Get tested for HIV at least once in your life, whether you are at risk for HIV or not.  Cancer screening tests  Cervical cancer screening: If you have a cervix, begin getting regular cervical cancer screening tests at age 21. Most people who have regular screenings with normal results can stop after age 65. Talk about this with your provider.  Breast cancer scan (mammogram): If you've ever had breasts, begin having regular mammograms starting at age 40. This is a scan to check for breast cancer.  Colon cancer screening: It is important to start screening for colon cancer at age 45.  Have a colonoscopy test every 10 years (or more often if you're at risk) Or, ask your provider about stool tests like a FIT test every year or Cologuard test every 3 years.  To learn more about your testing options, visit: https://www.[employer]/868681.pdf.  For help making a decision, visit: https://bit.ly/gs32873.  Prostate cancer screening test: If you have a prostate and are age 55 to 69, ask your provider if you would benefit from a yearly prostate cancer screening test.  Lung cancer screening: If you are a current or former smoker age 50 to 80, ask your care team if ongoing lung cancer screenings are right for you.  For informational purposes only. Not to replace the advice of your health care provider. Copyright   2023 [employer]. All rights reserved. Clinically reviewed by the [facility]. [employer] 734662 - REV 01/24.    Learning About Stress  What is stress?     Stress is your  body's response to a hard situation. Your body can have a physical, emotional, or mental response. Stress is a fact of life for most people, and it affects everyone differently. What causes stress for you may not be stressful for someone else.  A lot of things can cause stress. You may feel stress when you go on a job interview, take a test, or run a race. This kind of short-term stress is normal and even useful. It can help you if you need to work hard or react quickly. For example, stress can help you finish an important job on time.  Long-term stress is caused by ongoing stressful situations or events. Examples of long-term stress include long-term health problems, ongoing problems at work, or conflicts in your family. Long-term stress can harm your health.  How does stress affect your health?  When you are stressed, your body responds as though you are in danger. It makes hormones that speed up your heart, make you breathe faster, and give you a burst of energy. This is called the fight-or-flight stress response. If the stress is over quickly, your body goes back to normal and no harm is done.  But if stress happens too often or lasts too long, it can have bad effects. Long-term stress can make you more likely to get sick, and it can make symptoms of some diseases worse. If you tense up when you are stressed, you may develop neck, shoulder, or low back pain. Stress is linked to high blood pressure and heart disease.  Stress also harms your emotional health. It can make you lyons, tense, or depressed. Your relationships may suffer, and you may not do well at work or school.  What can you do to manage stress?  You can try these things to help manage stress:   Do something active. Exercise or activity can help reduce stress. Walking is a great way to get started. Even everyday activities such as housecleaning or yard work can help.  Try yoga or adalberto chi. These techniques combine exercise and meditation. You may need  some training at first to learn them.  Do something you enjoy. For example, listen to music or go to a movie. Practice your hobby or do volunteer work.  Meditate. This can help you relax, because you are not worrying about what happened before or what may happen in the future.  Do guided imagery. Imagine yourself in any setting that helps you feel calm. You can use online videos, books, or a teacher to guide you.  Do breathing exercises. For example:  From a standing position, bend forward from the waist with your knees slightly bent. Let your arms dangle close to the floor.  Breathe in slowly and deeply as you return to a standing position. Roll up slowly and lift your head last.  Hold your breath for just a few seconds in the standing position.  Breathe out slowly and bend forward from the waist.  Let your feelings out. Talk, laugh, cry, and express anger when you need to. Talking with supportive friends or family, a counselor, or a joleen leader about your feelings is a healthy way to relieve stress. Avoid discussing your feelings with people who make you feel worse.  Write. It may help to write about things that are bothering you. This helps you find out how much stress you feel and what is causing it. When you know this, you can find better ways to cope.  What can you do to prevent stress?  You might try some of these things to help prevent stress:  Manage your time. This helps you find time to do the things you want and need to do.  Get enough sleep. Your body recovers from the stresses of the day while you are sleeping.  Get support. Your family, friends, and community can make a difference in how you experience stress.  Limit your news feed. Avoid or limit time on social media or news that may make you feel stressed.  Do something active. Exercise or activity can help reduce stress. Walking is a great way to get started.  Where can you learn more?  Go to https://www.healthwise.net/patiented  Enter N032 in the  "search box to learn more about \"Learning About Stress.\"  Current as of: October 24, 2023               Content Version: 14.0    4489-3184 [employer].   Care instructions adapted under license by your healthcare professional. If you have questions about a medical condition or this instruction, always ask your healthcare professional. [employer] disclaims any warranty or liability for your use of this information.      "

## 2024-03-19 NOTE — PROGRESS NOTES
Preventive Care Visit  [facility]  Oscar Garcia PA-C, Family Medicine  Mar 19, 2024      Assessment & Plan     Encounter for Medicare annual wellness exam      Screening for diabetes mellitus  Screening  - Glucose; Future  - Glucose    Screening for hyperlipidemia  Rechecking today. We reviewed last years results and last years ASCVD risk score.  She is going to try to work on diet, we will also see what the result is this year.   - Lipid panel reflex to direct LDL Fasting; Future  - Lipid panel reflex to direct LDL Fasting    Osteopenia, unspecified location  She had this on DEXA from 2022, will repeat next year. Recommended supplementation and strength/resistance training.               Counseling  Appropriate preventive services were discussed with this patient, including applicable screening as appropriate for fall prevention, nutrition, physical activity, Tobacco-use cessation, weight loss and cognition.  Checklist reviewing preventive services available has been given to the patient.  Reviewed patient's diet, addressing concerns and/or questions.   She is at risk for psychosocial distress and has been provided with information to reduce risk.           Elizabeth Wall is a 69 year old, presenting for the following:  Physical        3/19/2024     8:05 AM   Additional Questions   Roomed by PILAR   Accompanied by NA         3/19/2024     8:05 AM   Patient Reported Additional Medications   Patient reports taking the following new medications None        Health Care Directive  Patient does not have a Health Care Directive or Living Will: Previously discussed    HPI              3/19/2024   General Health   How would you rate your overall physical health? Good   Feel stress (tense, anxious, or unable to sleep) Only a little   (!) STRESS CONCERN      3/19/2024   Nutrition   Diet: Regular (no restrictions)         3/19/2024   Exercise   Days per week of moderate/strenous exercise 7 days          3/19/2024   Social Factors   Frequency of gathering with friends or relatives Three times a week   Worry food won't last until get money to buy more No   Food not last or not have enough money for food? No   Do you have housing?  Yes   Are you worried about losing your housing? No   Lack of transportation? No   Unable to get utilities (heat,electricity)? No         4/30/2023   Fall Risk   Fallen 2 or more times in the past year? No          3/19/2024   Activities of Daily Living- Home Safety   Needs help with the following daily activites None of the above   Safety concerns in the home None of the above         3/19/2024   Dental   Dentist two times every year? Yes         3/19/2024   Hearing Screening   Hearing concerns? None of the above         3/19/2024   Driving Risk Screening   Patient/family members have concerns about driving No         3/19/2024   General Alertness/Fatigue Screening   Have you been more tired than usual lately? No         3/19/2024   Urinary Incontinence Screening   Bothered by leaking urine in past 6 months No         3/19/2024   TB Screening   Were you born outside of the [place]? No         Today's PHQ-2 Score:       3/19/2024     7:57 AM   PHQ-2 ( 1999 Pfizer)   Q1: Little interest or pleasure in doing things 0   Q2: Feeling down, depressed or hopeless 0   PHQ-2 Score 0   Q1: Little interest or pleasure in doing things Not at all   Q2: Feeling down, depressed or hopeless Not at all   PHQ-2 Score 0           3/19/2024   Substance Use   Alcohol more than 3/day or more than 7/wk No   Do you have a current opioid prescription? No   How severe/bad is pain from 1 to 10? 0/10 (No Pain)   Do you use any other substances recreationally? No     Social History     Tobacco Use    Smoking status: Never     Passive exposure: Never    Smokeless tobacco: Never   Vaping Use    Vaping Use: Never used   Substance Use Topics    Alcohol use: Never    Drug use: Never           4/5/2023   LAST FHS-7  RESULTS   1st degree relative breast or ovarian cancer Yes   Any relative bilateral breast cancer No   Any male have breast cancer No   Any ONE woman have BOTH breast AND ovarian cancer No   Any woman with breast cancer before 50yrs No   2 or more relatives with breast AND/OR ovarian cancer No   2 or more relatives with breast AND/OR bowel cancer No        Mammogram Screening - Mammogram every 1-2 years updated in Health Maintenance based on mutual decision making    ASCVD Risk   The 10-year ASCVD risk score (Michael DK, et al., 2019) is: 6.7%    Values used to calculate the score:      Age: 69 years      Sex: Female      Is Non- : No      Diabetic: No      Tobacco smoker: No      Systolic Blood Pressure: 130 mmHg      Is BP treated: No      HDL Cholesterol: 84 mg/dL      Total Cholesterol: 238 mg/dL            Reviewed and updated as needed this visit by Provider                    Past Medical History:   Diagnosis Date    AVM (arteriovenous malformation)      Past Surgical History:   Procedure Laterality Date    ABDOMEN SURGERY  06/2018    Exploratory Lap - internal hernia reduction, appendectomy, cecopexy.  Dr. June Delatorre    APPENDECTOMY  06/2018    AS ESOPHAGOSCOPY, DIAGNOSTIC  12/14/2012    AS HYSTEROSCOPY, SURGICAL; W/ ENDOMETRIAL ABLATION, ANY METHOD      BREAST BIOPSY, CORE RT/LT  2003    Benign    COLONOSCOPY      12/14/2012. 12/17/2018, 09/17/2020    IR OR ANGIOGRAM  08/06/2018    [facility] Dr. Luevano    LUMPECTOMY BREAST  2004    TONSILLECTOMY      VASCULAR SURGERY  06/2018     BP Readings from Last 3 Encounters:   03/19/24 130/88   10/04/23 (!) 149/88   05/01/23 138/78    Wt Readings from Last 3 Encounters:   03/19/24 52.6 kg (116 lb)   10/04/23 52.2 kg (115 lb)   05/01/23 51.7 kg (114 lb)                  Patient Active Problem List   Diagnosis    Constipation    Post-menopausal    S/P exploratory laparotomy    Advanced care planning/counseling discussion     Osteopenia, unspecified location     Past Surgical History:   Procedure Laterality Date    ABDOMEN SURGERY  06/2018    Exploratory Lap - internal hernia reduction, appendectomy, cecopexy.  Dr. June Delatorre    APPENDECTOMY  06/2018    AS ESOPHAGOSCOPY, DIAGNOSTIC  12/14/2012    AS HYSTEROSCOPY, SURGICAL; W/ ENDOMETRIAL ABLATION, ANY METHOD      BREAST BIOPSY, CORE RT/LT  2003    Benign    COLONOSCOPY      12/14/2012. 12/17/2018, 09/17/2020    IR OR ANGIOGRAM  08/06/2018    [facility] Dr. Luevano    LUMPECTOMY BREAST  2004    TONSILLECTOMY      VASCULAR SURGERY  06/2018       Social History     Tobacco Use    Smoking status: Never     Passive exposure: Never    Smokeless tobacco: Never   Substance Use Topics    Alcohol use: Never     Family History   Problem Relation Age of Onset    Breast Cancer Sister     Breast Cancer Sister     No Known Problems Mother     Heart Disease Father     Alzheimer Disease Father     Cancer Paternal Grandmother          No current outpatient medications on file.     No Known Allergies  Current providers sharing in care for this patient include:  Patient Care Team:  Oscar Garcia PA-C as PCP - General (Family Medicine)  Oscar Garcia PA-C as Assigned PCP  Jose Barnes DPM as Assigned Surgical Provider    The following health maintenance items are reviewed in Epic and correct as of today:  Health Maintenance   Topic Date Due    RSV VACCINE (Pregnancy & 60+) (1 - 1-dose 60+ series) Never done    INFLUENZA VACCINE (1) Never done    COVID-19 Vaccine (3 - 2023-24 season) 09/01/2023    MEDICARE ANNUAL WELLNESS VISIT  05/01/2024    ANNUAL REVIEW OF HM ORDERS  05/01/2024    FALL RISK ASSESSMENT  05/01/2024    MAMMO SCREENING  04/05/2025    COLORECTAL CANCER SCREENING  09/17/2025    GLUCOSE  05/01/2026    LIPID  05/01/2028    ADVANCE CARE PLANNING  05/01/2028    DTAP/TDAP/TD IMMUNIZATION (3 - Td or Tdap) 03/10/2031    DEXA  05/03/2037    HEPATITIS C SCREENING  Completed     "PHQ-2 (once per calendar year)  Completed    Pneumococcal Vaccine: 65+ Years  Completed    ZOSTER IMMUNIZATION  Completed    IPV IMMUNIZATION  Aged Out    HPV IMMUNIZATION  Aged Out    MENINGITIS IMMUNIZATION  Aged Out    RSV MONOCLONAL ANTIBODY  Aged Out    PAP  Discontinued         Review of Systems  Constitutional, HEENT, cardiovascular, pulmonary, GI, , musculoskeletal, neuro, skin, endocrine and psych systems are negative, except as otherwise noted.     Objective    Exam  /88   Pulse 65   Temp 97.8  F (36.6  C) (Temporal)   Resp 16   Ht 1.57 m (5' 1.81\")   Wt 52.6 kg (116 lb)   SpO2 99%   BMI 21.35 kg/m     Estimated body mass index is 21.35 kg/m  as calculated from the following:    Height as of this encounter: 1.57 m (5' 1.81\").    Weight as of this encounter: 52.6 kg (116 lb).    Physical Exam  GENERAL: alert and no distress  EYES: Eyes grossly normal to inspection, PERRL and conjunctivae and sclerae normal  HENT: ear canals and TM's normal, nose and mouth without ulcers or lesions  NECK: no adenopathy, no asymmetry, masses, or scars  RESP: lungs clear to auscultation - no rales, rhonchi or wheezes  CV: regular rate and rhythm, normal S1 S2, no S3 or S4, no murmur, click or rub, no peripheral edema  ABDOMEN: soft, nontender, no hepatosplenomegaly, no masses and bowel sounds normal  MS: no gross musculoskeletal defects noted, no edema  SKIN: no suspicious lesions or rashes  NEURO: Normal strength and tone, mentation intact and speech normal  PSYCH: mentation appears normal, affect normal/bright         3/19/2024   Mini Cog   Clock Draw Score 2 Normal   3 Item Recall 3 objects recalled   Mini Cog Total Score 5         Vision Screen  Patient wears corrective lenses (select all that apply): (!) NOT worn during vision screen;Does NOT wear regularly  Vision Screen Results: Pass      Signed Electronically by: Oscar Garcia PA-C    "

## 2025-03-29 SDOH — HEALTH STABILITY: PHYSICAL HEALTH: ON AVERAGE, HOW MANY DAYS PER WEEK DO YOU ENGAGE IN MODERATE TO STRENUOUS EXERCISE (LIKE A BRISK WALK)?: 6 DAYS

## 2025-03-29 SDOH — HEALTH STABILITY: PHYSICAL HEALTH: ON AVERAGE, HOW MANY MINUTES DO YOU ENGAGE IN EXERCISE AT THIS LEVEL?: 60 MIN

## 2025-03-29 ASSESSMENT — SOCIAL DETERMINANTS OF HEALTH (SDOH): HOW OFTEN DO YOU GET TOGETHER WITH FRIENDS OR RELATIVES?: MORE THAN THREE TIMES A WEEK

## 2025-03-31 ENCOUNTER — OFFICE VISIT (OUTPATIENT)
Dept: FAMILY MEDICINE | Facility: OTHER | Age: 70
End: 2025-03-31
Attending: PHYSICIAN ASSISTANT
Payer: COMMERCIAL

## 2025-03-31 VITALS
WEIGHT: 114.5 LBS | BODY MASS INDEX: 20.29 KG/M2 | TEMPERATURE: 97.7 F | DIASTOLIC BLOOD PRESSURE: 86 MMHG | HEIGHT: 63 IN | SYSTOLIC BLOOD PRESSURE: 122 MMHG | RESPIRATION RATE: 14 BRPM | HEART RATE: 70 BPM | OXYGEN SATURATION: 99 %

## 2025-03-31 DIAGNOSIS — Z13.220 SCREENING FOR HYPERLIPIDEMIA: ICD-10-CM

## 2025-03-31 DIAGNOSIS — M85.80 OSTEOPENIA, UNSPECIFIED LOCATION: ICD-10-CM

## 2025-03-31 DIAGNOSIS — R73.09 ELEVATED GLUCOSE: ICD-10-CM

## 2025-03-31 DIAGNOSIS — Z78.0 ASYMPTOMATIC POSTMENOPAUSAL ESTROGEN DEFICIENCY: ICD-10-CM

## 2025-03-31 DIAGNOSIS — Z00.00 ENCOUNTER FOR MEDICARE ANNUAL WELLNESS EXAM: Primary | ICD-10-CM

## 2025-03-31 LAB
CHOLEST SERPL-MCNC: 223 MG/DL
EST. AVERAGE GLUCOSE BLD GHB EST-MCNC: 120 MG/DL
FASTING STATUS PATIENT QL REPORTED: NO
HBA1C MFR BLD: 5.8 % (ref 0–5.6)
HDLC SERPL-MCNC: 87 MG/DL
LDLC SERPL CALC-MCNC: 106 MG/DL
NONHDLC SERPL-MCNC: 136 MG/DL
TRIGL SERPL-MCNC: 148 MG/DL

## 2025-03-31 PROCEDURE — G0439 PPPS, SUBSEQ VISIT: HCPCS | Performed by: PHYSICIAN ASSISTANT

## 2025-03-31 PROCEDURE — 80061 LIPID PANEL: CPT | Performed by: PHYSICIAN ASSISTANT

## 2025-03-31 PROCEDURE — 82306 VITAMIN D 25 HYDROXY: CPT | Performed by: PHYSICIAN ASSISTANT

## 2025-03-31 PROCEDURE — 3079F DIAST BP 80-89 MM HG: CPT | Performed by: PHYSICIAN ASSISTANT

## 2025-03-31 PROCEDURE — 99213 OFFICE O/P EST LOW 20 MIN: CPT | Mod: 25 | Performed by: PHYSICIAN ASSISTANT

## 2025-03-31 PROCEDURE — 36415 COLL VENOUS BLD VENIPUNCTURE: CPT | Performed by: PHYSICIAN ASSISTANT

## 2025-03-31 PROCEDURE — 3074F SYST BP LT 130 MM HG: CPT | Performed by: PHYSICIAN ASSISTANT

## 2025-03-31 PROCEDURE — 1126F AMNT PAIN NOTED NONE PRSNT: CPT | Performed by: PHYSICIAN ASSISTANT

## 2025-03-31 PROCEDURE — 83036 HEMOGLOBIN GLYCOSYLATED A1C: CPT | Performed by: PHYSICIAN ASSISTANT

## 2025-03-31 ASSESSMENT — PAIN SCALES - GENERAL: PAINLEVEL_OUTOF10: NO PAIN (0)

## 2025-03-31 NOTE — PATIENT INSTRUCTIONS
Patient Education   Preventive Care Advice   This is general advice given by our system to help you stay healthy. However, your care team may have specific advice just for you. Please talk to your care team about your preventive care needs.  Nutrition  Eat 5 or more servings of fruits and vegetables each day.  Try wheat bread, brown rice and whole grain pasta (instead of white bread, rice, and pasta).  Get enough calcium and vitamin D. Check the label on foods and aim for 100% of the RDA (recommended daily allowance).  Lifestyle  Exercise at least 150 minutes each week  (30 minutes a day, 5 days a week).  Do muscle strengthening activities 2 days a week. These help control your weight and prevent disease.  No smoking.  Wear sunscreen to prevent skin cancer.  Have a dental exam and cleaning every 6 months.  Yearly exams  See your health care team every year to talk about:  Any changes in your health.  Any medicines your care team has prescribed.  Preventive care, family planning, and ways to prevent chronic diseases.  Shots (vaccines)   HPV shots (up to age 26), if you've never had them before.  Hepatitis B shots (up to age 59), if you've never had them before.  COVID-19 shot: Get this shot when it's due.  Flu shot: Get a flu shot every year.  Tetanus shot: Get a tetanus shot every 10 years.  Pneumococcal, hepatitis A, and RSV shots: Ask your care team if you need these based on your risk.  Shingles shot (for age 50 and up)  General health tests  Diabetes screening:  Starting at age 35, Get screened for diabetes at least every 3 years.  If you are younger than age 35, ask your care team if you should be screened for diabetes.  Cholesterol test: At age 39, start having a cholesterol test every 5 years, or more often if advised.  Bone density scan (DEXA): At age 50, ask your care team if you should have this scan for osteoporosis (brittle bones).  Hepatitis C: Get tested at least once in your life.  STIs (sexually  transmitted infections)  Before age 24: Ask your care team if you should be screened for STIs.  After age 24: Get screened for STIs if you're at risk. You are at risk for STIs (including HIV) if:  You are sexually active with more than one person.  You don't use condoms every time.  You or a partner was diagnosed with a sexually transmitted infection.  If you are at risk for HIV, ask about PrEP medicine to prevent HIV.  Get tested for HIV at least once in your life, whether you are at risk for HIV or not.  Cancer screening tests  Cervical cancer screening: If you have a cervix, begin getting regular cervical cancer screening tests starting at age 21.  Breast cancer scan (mammogram): If you've ever had breasts, begin having regular mammograms starting at age 40. This is a scan to check for breast cancer.  Colon cancer screening: It is important to start screening for colon cancer at age 45.  Have a colonoscopy test every 10 years (or more often if you're at risk) Or, ask your provider about stool tests like a FIT test every year or Cologuard test every 3 years.  To learn more about your testing options, visit:   .  For help making a decision, visit:   https://bit.ly/ne88687.  Prostate cancer screening test: If you have a prostate, ask your care team if a prostate cancer screening test (PSA) at age 55 is right for you.  Lung cancer screening: If you are a current or former smoker ages 50 to 80, ask your care team if ongoing lung cancer screenings are right for you.  For informational purposes only. Not to replace the advice of your health care provider. Copyright   2023 [facility] [employer]. All rights reserved. Clinically reviewed by the [facility]. [employer] [id number] - REV 01/24.  Bladder Training: Care Instructions  Your Care Instructions     Bladder training is used to treat urge incontinence and stress incontinence. Urge incontinence means that the need to urinate comes on so fast  that you can't get to a toilet in time. Stress incontinence means that you leak urine because of pressure on your bladder. For example, it may happen when you laugh, cough, or lift something heavy.  Bladder training can increase how long you can wait before you have to urinate. It can also help your bladder hold more urine. And it can give you better control over the urge to urinate.  It is important to remember that bladder training takes a few weeks to a few months to make a difference. You may not see results right away, but don't give up.  Follow-up care is a key part of your treatment and safety. Be sure to make and go to all appointments, and call your doctor if you are having problems. It's also a good idea to know your test results and keep a list of the medicines you take.  How can you care for yourself at home?  Work with your doctor to come up with a bladder training program that is right for you. You may use one or more of the following methods.  Delayed urination  In the beginning, try to keep from urinating for 5 minutes after you first feel the need to go.  While you wait, take deep, slow breaths to relax. Kegel exercises can also help you delay the need to go to the bathroom.  After some practice, when you can easily wait 5 minutes to urinate, try to wait 10 minutes before you urinate.  Slowly increase the waiting period until you are able to control when you have to urinate.  Scheduled urination  Empty your bladder when you first wake up in the morning.  Schedule times throughout the day when you will urinate.  Start by going to the bathroom every hour, even if you don't need to go.  Slowly increase the time between trips to the bathroom.  When you have found a schedule that works well for you, keep doing it.  If you wake up during the night and have to urinate, do it. Apply your schedule to waking hours only.  Kegel exercises  These tighten and strengthen pelvic muscles, which can help you control  "the flow of urine. (If doing these exercises causes pain, stop doing them and talk with your doctor.) To do Kegel exercises:  Squeeze your muscles as if you were trying not to pass gas. Or squeeze your muscles as if you were stopping the flow of urine. Your belly, legs, and buttocks shouldn't move.  Hold the squeeze for 3 seconds, then relax for 5 to 10 seconds.  Start with 3 seconds, then add 1 second each week until you are able to squeeze for 10 seconds.  Repeat the exercise 10 times a session. Do 3 to 8 sessions a day.  When should you call for help?  Watch closely for changes in your health, and be sure to contact your doctor if:    Your incontinence is getting worse.     You do not get better as expected.   Where can you learn more?  Go to https://www.[employer].net/patiented  Enter V684 in the search box to learn more about \"Bladder Training: Care Instructions.\"  Current as of: April 30, 2024  Content Version: 14.4    1147-2922 [employer] [facility].   Care instructions adapted under license by your healthcare professional. If you have questions about a medical condition or this instruction, always ask your healthcare professional. [employer] [facility] disclaims any warranty or liability for your use of this information.       "

## 2025-03-31 NOTE — PROGRESS NOTES
Preventive Care Visit  [facility]  Oscar Garcia PA-C, Family Medicine  Mar 31, 2025      Assessment & Plan     Encounter for Medicare annual wellness exam  - Discussed recommended seasonal vaccinations.     Asymptomatic postmenopausal estrogen deficiency  Osteopenia, unspecified location  Rechecking Bone Density, last DEXA was 2022 showing osteopenia.  She is on multivitamin + calcium/Vit D supplement. Will check Vitamin D level to be sure sufficient. Continue with strength/resistance training and activities  - DX Bone Density; Future  - Vitamin D Deficiency; Future  - Vitamin D Deficiency    Screening for hyperlipidemia  Screening. Discussed last ASCVD.   - Lipid panel reflex to direct LDL Fasting; Future  - Lipid panel reflex to direct LDL Fasting    Elevated glucose  Checking A1c for glucose control.   - Hemoglobin A1c; Future  - Hemoglobin A1c        Counseling  Appropriate preventive services were addressed with this patient via screening, questionnaire, or discussion as appropriate for fall prevention, nutrition, physical activity, Tobacco-use cessation, social engagement, weight loss and cognition.  Checklist reviewing preventive services available has been given to the patient.  Reviewed patient's diet, addressing concerns and/or questions.   Information on urinary incontinence and treatment options given to patient.           Elizabeth Wall is a 70 year old, presenting for the following:  Wellness Visit        3/31/2025    12:55 PM   Additional Questions   Roomed by CADEN Gee   Accompanied by Self           HPI         Advance Care Planning  Patient does not have a Health Care Directive: Discussed advance care planning with patient; however, patient declined at this time.      3/29/2025   General Health   How would you rate your overall physical health? Good   Feel stress (tense, anxious, or unable to sleep) Only a little   (!) STRESS CONCERN      3/29/2025   Nutrition    Diet: Regular (no restrictions)         3/29/2025   Exercise   Days per week of moderate/strenous exercise 6 days   Average minutes spent exercising at this level 60 min         3/29/2025   Social Factors   Frequency of gathering with friends or relatives More than three times a week   Worry food won't last until get money to buy more No   Food not last or not have enough money for food? No   Do you have housing? (Housing is defined as stable permanent housing and does not include staying ouside in a car, in a tent, in an abandoned building, in an overnight shelter, or couch-surfing.) Yes   Are you worried about losing your housing? No   Lack of transportation? No   Unable to get utilities (heat,electricity)? No         3/29/2025   Fall Risk   Fallen 2 or more times in the past year? No    No   Trouble with walking or balance? No    No       Multiple values from one day are sorted in reverse-chronological order          3/29/2025   Activities of Daily Living- Home Safety   Needs help with the following daily activites None of the above   Safety concerns in the home None of the above         3/29/2025   Dental   Dentist two times every year? Yes         3/29/2025   Hearing Screening   Hearing concerns? None of the above         3/29/2025   Driving Risk Screening   Patient/family members have concerns about driving No         3/29/2025   General Alertness/Fatigue Screening   Have you been more tired than usual lately? No         3/29/2025   Urinary Incontinence Screening   Bothered by leaking urine in past 6 months Yes           3/19/2024   TB Screening   Were you born outside of the [place]? No           Today's PHQ-2 Score:       3/31/2025    12:20 PM   PHQ-2 ( 1999 [employer])   Q1: Little interest or pleasure in doing things 0   Q2: Feeling down, depressed or hopeless 0   PHQ-2 Score 0    Q1: Little interest or pleasure in doing things Not at all   Q2: Feeling down, depressed or hopeless Not at all   PHQ-2 Score 0        Patient-reported           3/29/2025   Substance Use   Alcohol more than 3/day or more than 7/wk No   Do you have a current opioid prescription? No   How severe/bad is pain from 1 to 10? 0/10 (No Pain)   Do you use any other substances recreationally? No     Social History     Tobacco Use    Smoking status: Never     Passive exposure: Never    Smokeless tobacco: Never   Vaping Use    Vaping status: Never Used   Substance Use Topics    Alcohol use: Never    Drug use: Never           3/19/2024   LAST FHS-7 RESULTS   1st degree relative breast or ovarian cancer Yes   Any relative bilateral breast cancer No   Any male have breast cancer No   Any ONE woman have BOTH breast AND ovarian cancer No   Any woman with breast cancer before 50yrs No   2 or more relatives with breast AND/OR ovarian cancer No   2 or more relatives with breast AND/OR bowel cancer No        Mammogram Screening - Mammogram every 1-2 years updated in Health Maintenance based on mutual decision making      History of abnormal Pap smear: No - age 65 or older with adequate negative prior screening test results (3 consecutive negative cytology results, 2 consecutive negative cotesting results, or 2 consecutive negative HrHPV test results within 10 years, with the most recent test occurring within the recommended screening interval for the test used)        5/20/2019    12:00 AM   PAP / HPV   PAP-ABSTRACT See Scanned Document           This result is from an external source.     ASCVD Risk   The 10-year ASCVD risk score (Michael ABDALLA, et al., 2019) is: 6.5%    Values used to calculate the score:      Age: 70 years      Sex: Female      Is Non- : No      Diabetic: No      Tobacco smoker: No      Systolic Blood Pressure: 122 mmHg      Is BP treated: No      HDL Cholesterol: 94 mg/dL      Total Cholesterol: 240 mg/dL            Reviewed and updated as needed this visit by Provider                    Past Medical History:    Diagnosis Date    AVM (arteriovenous malformation)      Past Surgical History:   Procedure Laterality Date    ABDOMEN SURGERY  06/2018    Exploratory Lap - internal hernia reduction, appendectomy, cecopexy.  Dr. June Delatorre    APPENDECTOMY  06/2018    AS ESOPHAGOSCOPY, DIAGNOSTIC  12/14/2012    AS HYSTEROSCOPY, SURGICAL; W/ ENDOMETRIAL ABLATION, ANY METHOD      BREAST BIOPSY, CORE RT/LT  2003    Benign    COLONOSCOPY      12/14/2012. 12/17/2018, 09/17/2020    IR OR ANGIOGRAM  08/06/2018    [facility] Dr. Luevano    LUMPECTOMY BREAST  2004    TONSILLECTOMY      VASCULAR SURGERY  06/2018     BP Readings from Last 3 Encounters:   03/31/25 122/86   03/19/24 130/88   10/04/23 (!) 149/88    Wt Readings from Last 3 Encounters:   03/31/25 51.9 kg (114 lb 8 oz)   03/19/24 52.6 kg (116 lb)   10/04/23 52.2 kg (115 lb)                  Patient Active Problem List   Diagnosis    Post-menopausal    S/P exploratory laparotomy    Advanced care planning/counseling discussion    Osteopenia, unspecified location     Past Surgical History:   Procedure Laterality Date    ABDOMEN SURGERY  06/2018    Exploratory Lap - internal hernia reduction, appendectomy, cecopexy.  Dr. June Delatorre    APPENDECTOMY  06/2018    AS ESOPHAGOSCOPY, DIAGNOSTIC  12/14/2012    AS HYSTEROSCOPY, SURGICAL; W/ ENDOMETRIAL ABLATION, ANY METHOD      BREAST BIOPSY, CORE RT/LT  2003    Benign    COLONOSCOPY      12/14/2012. 12/17/2018, 09/17/2020    IR OR ANGIOGRAM  08/06/2018    [facility] Dr. Luevano    LUMPECTOMY BREAST  2004    TONSILLECTOMY      VASCULAR SURGERY  06/2018       Social History     Tobacco Use    Smoking status: Never     Passive exposure: Never    Smokeless tobacco: Never   Substance Use Topics    Alcohol use: Never     Family History   Problem Relation Age of Onset    Breast Cancer Sister     Breast Cancer Sister     No Known Problems Mother     Heart Disease Father     Alzheimer Disease Father     Cancer Paternal  "Grandmother          Current providers sharing in care for this patient include:  Patient Care Team:  Oscar Garcia PA-C as PCP - General (Family Medicine)  Oscar Garcia PA-C as Assigned PCP  Jose Barnes DPM as Assigned Surgical Provider  Krystle Ritter APRN CNP as Nurse Practitioner (Otolaryngology)    The following health maintenance items are reviewed in Epic and correct as of today:  Health Maintenance   Topic Date Due    ANNUAL REVIEW OF HM ORDERS  05/01/2024    INFLUENZA VACCINE (1) Never done    COVID-19 Vaccine (3 - 2024-25 season) 09/01/2024    COLORECTAL CANCER SCREENING  09/17/2025    MAMMO SCREENING  03/19/2026    MEDICARE ANNUAL WELLNESS VISIT  03/31/2026    FALL RISK ASSESSMENT  03/31/2026    DIABETES SCREENING  03/19/2027    ADVANCE CARE PLANNING  05/01/2028    LIPID  03/19/2029    DTAP/TDAP/TD IMMUNIZATION (3 - Td or Tdap) 03/10/2031    RSV VACCINE (1 - 1-dose 75+ series) 03/03/2032    DEXA  05/03/2037    HEPATITIS C SCREENING  Completed    PHQ-2 (once per calendar year)  Completed    Pneumococcal Vaccine: 50+ Years  Completed    ZOSTER IMMUNIZATION  Completed    HPV IMMUNIZATION  Aged Out    MENINGITIS IMMUNIZATION  Aged Out    PAP  Discontinued         Review of Systems  Constitutional, HEENT, cardiovascular, pulmonary, GI, , musculoskeletal, neuro, skin, endocrine and psych systems are negative, except as otherwise noted.     Objective    Exam  /86   Pulse 70   Temp 97.7  F (36.5  C) (Temporal)   Resp 14   Ht 1.59 m (5' 2.6\")   Wt 51.9 kg (114 lb 8 oz)   SpO2 99%   BMI 20.54 kg/m     Estimated body mass index is 20.54 kg/m  as calculated from the following:    Height as of this encounter: 1.59 m (5' 2.6\").    Weight as of this encounter: 51.9 kg (114 lb 8 oz).    Physical Exam  GENERAL: alert and no distress  EYES: Eyes grossly normal to inspection, PERRL and conjunctivae and sclerae normal  HENT: ear canals and TM's normal, nose and mouth without ulcers or " lesions  NECK: no adenopathy, no asymmetry, masses, or scars  RESP: lungs clear to auscultation - no rales, rhonchi or wheezes  CV: regular rate and rhythm, normal S1 S2, no S3 or S4, no murmur, click or rub, no peripheral edema  ABDOMEN: soft, nontender, no hepatosplenomegaly, no masses and bowel sounds normal  MS: no gross musculoskeletal defects noted, no edema  SKIN: no suspicious lesions or rashes  NEURO: Normal strength and tone, mentation intact and speech normal  PSYCH: mentation appears normal, affect normal/bright        3/31/2025   Mini Cog   Clock Draw Score 2 Normal   3 Item Recall 2 objects recalled   Mini Cog Total Score 4             3/19/2024   Vision Screen   Patient wears corrective lenses (select all that apply) NOT worn during vision screen;Does NOT wear regularly   Vision Screen Results Pass       Signed Electronically by: Oscar Garcia PA-C

## 2025-04-01 LAB — VIT D+METAB SERPL-MCNC: 26 NG/ML (ref 20–50)

## 2025-04-02 ENCOUNTER — ANCILLARY PROCEDURE (OUTPATIENT)
Dept: MAMMOGRAPHY | Facility: OTHER | Age: 70
End: 2025-04-02
Attending: PHYSICIAN ASSISTANT
Payer: COMMERCIAL

## 2025-04-02 DIAGNOSIS — Z12.31 VISIT FOR SCREENING MAMMOGRAM: ICD-10-CM

## 2025-04-02 PROCEDURE — 77063 BREAST TOMOSYNTHESIS BI: CPT | Mod: TC | Performed by: STUDENT IN AN ORGANIZED HEALTH CARE EDUCATION/TRAINING PROGRAM

## 2025-04-02 PROCEDURE — 77067 SCR MAMMO BI INCL CAD: CPT | Mod: TC | Performed by: STUDENT IN AN ORGANIZED HEALTH CARE EDUCATION/TRAINING PROGRAM

## 2025-04-03 ENCOUNTER — HOSPITAL ENCOUNTER (OUTPATIENT)
Dept: BONE DENSITY | Facility: CLINIC | Age: 70
Discharge: HOME | End: 2025-04-03
Attending: PHYSICIAN ASSISTANT
Payer: COMMERCIAL

## 2025-04-03 DIAGNOSIS — Z78.0 ASYMPTOMATIC POSTMENOPAUSAL ESTROGEN DEFICIENCY: ICD-10-CM

## 2025-04-03 PROCEDURE — 77080 DXA BONE DENSITY AXIAL: CPT

## 2025-08-18 ENCOUNTER — PATIENT OUTREACH (OUTPATIENT)
Dept: CARE COORDINATION | Facility: CLINIC | Age: 70
End: 2025-08-18
Payer: COMMERCIAL